# Patient Record
Sex: FEMALE | Race: WHITE | Employment: OTHER | URBAN - METROPOLITAN AREA
[De-identification: names, ages, dates, MRNs, and addresses within clinical notes are randomized per-mention and may not be internally consistent; named-entity substitution may affect disease eponyms.]

---

## 2017-04-12 ENCOUNTER — OFFICE VISIT (OUTPATIENT)
Dept: FAMILY MEDICINE CLINIC | Age: 67
End: 2017-04-12

## 2017-04-12 VITALS
BODY MASS INDEX: 36.82 KG/M2 | SYSTOLIC BLOOD PRESSURE: 124 MMHG | DIASTOLIC BLOOD PRESSURE: 82 MMHG | WEIGHT: 221 LBS | HEIGHT: 65 IN

## 2017-04-12 DIAGNOSIS — R07.81 RIB PAIN ON RIGHT SIDE: Primary | ICD-10-CM

## 2017-04-12 DIAGNOSIS — L03.113 CELLULITIS OF RIGHT UPPER EXTREMITY: ICD-10-CM

## 2017-04-12 DIAGNOSIS — R53.83 FATIGUE, UNSPECIFIED TYPE: ICD-10-CM

## 2017-04-12 DIAGNOSIS — Z11.59 NEED FOR HEPATITIS C SCREENING TEST: ICD-10-CM

## 2017-04-12 DIAGNOSIS — E78.2 MIXED HYPERLIPIDEMIA: ICD-10-CM

## 2017-04-12 LAB
A/G RATIO: 2 (ref 1.1–2.2)
ALBUMIN SERPL-MCNC: 4.5 G/DL (ref 3.4–5)
ALP BLD-CCNC: 79 U/L (ref 40–129)
ALT SERPL-CCNC: 27 U/L (ref 10–40)
ANION GAP SERPL CALCULATED.3IONS-SCNC: 16 MMOL/L (ref 3–16)
AST SERPL-CCNC: 18 U/L (ref 15–37)
BILIRUB SERPL-MCNC: 0.5 MG/DL (ref 0–1)
BUN BLDV-MCNC: 10 MG/DL (ref 7–20)
CALCIUM SERPL-MCNC: 9.2 MG/DL (ref 8.3–10.6)
CHLORIDE BLD-SCNC: 105 MMOL/L (ref 99–110)
CHOLESTEROL, TOTAL: 236 MG/DL (ref 0–199)
CO2: 26 MMOL/L (ref 21–32)
CREAT SERPL-MCNC: 0.8 MG/DL (ref 0.6–1.2)
GFR AFRICAN AMERICAN: >60
GFR NON-AFRICAN AMERICAN: >60
GLOBULIN: 2.2 G/DL
GLUCOSE BLD-MCNC: 98 MG/DL (ref 70–99)
HCT VFR BLD CALC: 47.4 % (ref 36–48)
HDLC SERPL-MCNC: 56 MG/DL (ref 40–60)
HEMOGLOBIN: 15.6 G/DL (ref 12–16)
HEPATITIS C ANTIBODY INTERPRETATION: NORMAL
LDL CHOLESTEROL CALCULATED: 142 MG/DL
MCH RBC QN AUTO: 30.7 PG (ref 26–34)
MCHC RBC AUTO-ENTMCNC: 32.8 G/DL (ref 31–36)
MCV RBC AUTO: 93.5 FL (ref 80–100)
PDW BLD-RTO: 12.9 % (ref 12.4–15.4)
PLATELET # BLD: 206 K/UL (ref 135–450)
PMV BLD AUTO: 10.8 FL (ref 5–10.5)
POTASSIUM SERPL-SCNC: 4.7 MMOL/L (ref 3.5–5.1)
RBC # BLD: 5.07 M/UL (ref 4–5.2)
SODIUM BLD-SCNC: 147 MMOL/L (ref 136–145)
TOTAL PROTEIN: 6.7 G/DL (ref 6.4–8.2)
TRIGL SERPL-MCNC: 190 MG/DL (ref 0–150)
TSH SERPL DL<=0.05 MIU/L-ACNC: 0.79 UIU/ML (ref 0.27–4.2)
VITAMIN D 25-HYDROXY: 28.2 NG/ML
VLDLC SERPL CALC-MCNC: 38 MG/DL
WBC # BLD: 6.1 K/UL (ref 4–11)

## 2017-04-12 PROCEDURE — 36415 COLL VENOUS BLD VENIPUNCTURE: CPT | Performed by: FAMILY MEDICINE

## 2017-04-12 PROCEDURE — 99214 OFFICE O/P EST MOD 30 MIN: CPT | Performed by: FAMILY MEDICINE

## 2017-04-12 RX ORDER — DOXYCYCLINE HYCLATE 100 MG
100 TABLET ORAL 2 TIMES DAILY
Qty: 14 TABLET | Refills: 0 | Status: SHIPPED | OUTPATIENT
Start: 2017-04-12 | End: 2017-04-22

## 2017-04-12 ASSESSMENT — ENCOUNTER SYMPTOMS
RESPIRATORY NEGATIVE: 1
GASTROINTESTINAL NEGATIVE: 1

## 2017-05-11 ENCOUNTER — TELEPHONE (OUTPATIENT)
Dept: FAMILY MEDICINE CLINIC | Age: 67
End: 2017-05-11

## 2018-03-09 ENCOUNTER — OFFICE VISIT (OUTPATIENT)
Dept: FAMILY MEDICINE CLINIC | Age: 68
End: 2018-03-09

## 2018-03-09 VITALS
WEIGHT: 223.4 LBS | SYSTOLIC BLOOD PRESSURE: 140 MMHG | BODY MASS INDEX: 37.22 KG/M2 | DIASTOLIC BLOOD PRESSURE: 80 MMHG | HEIGHT: 65 IN

## 2018-03-09 DIAGNOSIS — Z00.00 WELL ADULT EXAM: Primary | ICD-10-CM

## 2018-03-09 DIAGNOSIS — R20.0 NUMBNESS OF TOES: ICD-10-CM

## 2018-03-09 LAB
A/G RATIO: 1.9 (ref 1.1–2.2)
ALBUMIN SERPL-MCNC: 4.3 G/DL (ref 3.4–5)
ALP BLD-CCNC: 80 U/L (ref 40–129)
ALT SERPL-CCNC: 19 U/L (ref 10–40)
ANION GAP SERPL CALCULATED.3IONS-SCNC: 14 MMOL/L (ref 3–16)
AST SERPL-CCNC: 20 U/L (ref 15–37)
BILIRUB SERPL-MCNC: 0.4 MG/DL (ref 0–1)
BUN BLDV-MCNC: 11 MG/DL (ref 7–20)
CALCIUM SERPL-MCNC: 8.7 MG/DL (ref 8.3–10.6)
CHLORIDE BLD-SCNC: 107 MMOL/L (ref 99–110)
CHOLESTEROL, TOTAL: 219 MG/DL (ref 0–199)
CO2: 23 MMOL/L (ref 21–32)
CREAT SERPL-MCNC: 0.7 MG/DL (ref 0.6–1.2)
GFR AFRICAN AMERICAN: >60
GFR NON-AFRICAN AMERICAN: >60
GLOBULIN: 2.3 G/DL
GLUCOSE BLD-MCNC: 104 MG/DL (ref 70–99)
HCT VFR BLD CALC: 45 % (ref 36–48)
HDLC SERPL-MCNC: 53 MG/DL (ref 40–60)
HEMOGLOBIN: 15.4 G/DL (ref 12–16)
IRON: 101 UG/DL (ref 37–145)
LDL CHOLESTEROL CALCULATED: 143 MG/DL
MCH RBC QN AUTO: 31.2 PG (ref 26–34)
MCHC RBC AUTO-ENTMCNC: 34.3 G/DL (ref 31–36)
MCV RBC AUTO: 91 FL (ref 80–100)
PDW BLD-RTO: 13.1 % (ref 12.4–15.4)
PLATELET # BLD: 212 K/UL (ref 135–450)
PMV BLD AUTO: 10.4 FL (ref 5–10.5)
POTASSIUM SERPL-SCNC: 4.5 MMOL/L (ref 3.5–5.1)
RBC # BLD: 4.94 M/UL (ref 4–5.2)
SODIUM BLD-SCNC: 144 MMOL/L (ref 136–145)
TOTAL PROTEIN: 6.6 G/DL (ref 6.4–8.2)
TRIGL SERPL-MCNC: 117 MG/DL (ref 0–150)
TSH SERPL DL<=0.05 MIU/L-ACNC: 1.26 UIU/ML (ref 0.27–4.2)
VITAMIN B-12: 398 PG/ML (ref 211–911)
VITAMIN D 25-HYDROXY: 29.6 NG/ML
VLDLC SERPL CALC-MCNC: 23 MG/DL
WBC # BLD: 4.6 K/UL (ref 4–11)

## 2018-03-09 PROCEDURE — 36415 COLL VENOUS BLD VENIPUNCTURE: CPT | Performed by: FAMILY MEDICINE

## 2018-03-09 PROCEDURE — 99397 PER PM REEVAL EST PAT 65+ YR: CPT | Performed by: FAMILY MEDICINE

## 2018-03-09 ASSESSMENT — PATIENT HEALTH QUESTIONNAIRE - PHQ9
2. FEELING DOWN, DEPRESSED OR HOPELESS: 0
SUM OF ALL RESPONSES TO PHQ QUESTIONS 1-9: 0
1. LITTLE INTEREST OR PLEASURE IN DOING THINGS: 0
SUM OF ALL RESPONSES TO PHQ9 QUESTIONS 1 & 2: 0

## 2018-03-09 ASSESSMENT — ENCOUNTER SYMPTOMS
GASTROINTESTINAL NEGATIVE: 1
RESPIRATORY NEGATIVE: 1

## 2018-03-09 NOTE — PROGRESS NOTES
Subjective:      Patient ID: Lorelei Butts is a 75 y.o. female. HPI   Well exam  Feels good   Noticed some numbness in her toes  No other complaints  Feels good   Stem cell injection left knee   Review of Systems   Constitutional: Negative. Respiratory: Negative. Cardiovascular: Negative. Gastrointestinal: Negative. Skin: Negative. Neurological: Negative. YOB: 1950    Date of Visit:  3/9/2018    Allergies   Allergen Reactions    Morphine Nausea And Vomiting    Penicillins Rash       No outpatient prescriptions have been marked as taking for the 3/9/18 encounter (Office Visit) with Fransisca Orosco DO. Vitals:    03/09/18 0917   BP: (!) 140/80   Cuff Size: Large Adult   Weight: 223 lb 6.4 oz (101.3 kg)   Height: 5' 5\" (1.651 m)     Body mass index is 37.18 kg/m². Wt Readings from Last 3 Encounters:   03/09/18 223 lb 6.4 oz (101.3 kg)   04/12/17 221 lb (100.2 kg)   09/20/16 226 lb (102.5 kg)     BP Readings from Last 3 Encounters:   03/09/18 (!) 140/80   04/12/17 124/82   09/20/16 136/82       Objective:   Physical Exam   Constitutional: She is oriented to person, place, and time. She appears well-developed and well-nourished. No distress. HENT:   Head: Normocephalic. Right Ear: External ear normal.   Left Ear: External ear normal.   Nose: Nose normal.   Mouth/Throat: Oropharynx is clear and moist.   Eyes: Conjunctivae and EOM are normal. Pupils are equal, round, and reactive to light. Left eye exhibits no discharge. Neck: Normal range of motion. No thyromegaly present. Cardiovascular: Normal rate, regular rhythm, normal heart sounds and intact distal pulses. No murmur heard. Pulmonary/Chest: Effort normal. No respiratory distress. She has no wheezes. She has no rales. Abdominal: Soft. She exhibits no distension and no mass. There is no guarding. Lymphadenopathy:     She has no cervical adenopathy.    Neurological: She is alert and oriented to person, place, and time. She has normal reflexes. Skin: Skin is warm and dry. No rash noted. No erythema. Psychiatric: She has a normal mood and affect. Her behavior is normal. Judgment and thought content normal.   Nursing note and vitals reviewed. Assessment:      Assessment/plan;  Callie Jeffy was seen today for check-up and blood work. Diagnoses and all orders for this visit:    Well adult exam  -     CBC  -     Comprehensive Metabolic Panel  -     Lipid Panel  -     Iron  -     TSH without Reflex  -     Vitamin B12  -     Vitamin D 25 Hydroxy    Numbness of toes      Return in about 1 year (around 3/9/2019).   Refuses vaccinations   Will do fit card

## 2018-07-06 ENCOUNTER — PATIENT MESSAGE (OUTPATIENT)
Dept: FAMILY MEDICINE CLINIC | Age: 68
End: 2018-07-06

## 2018-07-06 NOTE — TELEPHONE ENCOUNTER
From: Gris Cabrera May  To: Nora Jiang  Sent: 7/5/2018 1:09 PM EDT  Subject: Mammogram    Our records show that you are in need of a screening mammogram. The American Cancer Society's guidelines state that early detection of breast cancer improves the chances that breast cancer can be diagnosed at an early stage and treated successfully. Women age 36 and older should have a mammogram every year and should continue to do so for as long as they are in good health. To schedule a screening mammogram, you do not need an order from your doctor. You can call [facility] ([phone]) to schedule a mammogram at your earliest convenience. You can also call the office at [phone] to schedule your mammogram with the mobile unit that will be at our office on July 11th! If you already had a mammogram in the last 12 months, congratulations for taking this step toward good health!  Please call our office to inform us of the date and location of your (most recent) mammogram. We also welcome you to reach out to us online using

## 2018-10-16 DIAGNOSIS — Z12.12 SCREENING FOR COLORECTAL CANCER: Primary | ICD-10-CM

## 2018-10-16 DIAGNOSIS — Z12.11 SCREENING FOR COLORECTAL CANCER: Primary | ICD-10-CM

## 2018-10-16 LAB
CONTROL: NORMAL
HEMOCCULT STL QL: NEGATIVE

## 2018-10-16 PROCEDURE — 82274 ASSAY TEST FOR BLOOD FECAL: CPT | Performed by: FAMILY MEDICINE

## 2018-11-29 ENCOUNTER — OFFICE VISIT (OUTPATIENT)
Dept: FAMILY MEDICINE CLINIC | Age: 68
End: 2018-11-29
Payer: COMMERCIAL

## 2018-11-29 VITALS — DIASTOLIC BLOOD PRESSURE: 86 MMHG | BODY MASS INDEX: 37.18 KG/M2 | HEIGHT: 65 IN | SYSTOLIC BLOOD PRESSURE: 138 MMHG

## 2018-11-29 DIAGNOSIS — M75.81 ROTATOR CUFF TENDONITIS, RIGHT: ICD-10-CM

## 2018-11-29 DIAGNOSIS — M25.511 RIGHT SHOULDER PAIN, UNSPECIFIED CHRONICITY: Primary | ICD-10-CM

## 2018-11-29 PROCEDURE — 99213 OFFICE O/P EST LOW 20 MIN: CPT | Performed by: FAMILY MEDICINE

## 2018-11-29 PROCEDURE — 20610 DRAIN/INJ JOINT/BURSA W/O US: CPT | Performed by: FAMILY MEDICINE

## 2018-11-29 RX ORDER — METHYLPREDNISOLONE ACETATE 80 MG/ML
80 INJECTION, SUSPENSION INTRA-ARTICULAR; INTRALESIONAL; INTRAMUSCULAR; SOFT TISSUE ONCE
Status: COMPLETED | OUTPATIENT
Start: 2018-11-29 | End: 2018-11-29

## 2018-11-29 RX ORDER — TIZANIDINE 4 MG/1
4 TABLET ORAL 3 TIMES DAILY
Qty: 30 TABLET | Refills: 0 | Status: SHIPPED | OUTPATIENT
Start: 2018-11-29 | End: 2018-12-09

## 2018-11-29 RX ADMIN — METHYLPREDNISOLONE ACETATE 80 MG: 80 INJECTION, SUSPENSION INTRA-ARTICULAR; INTRALESIONAL; INTRAMUSCULAR; SOFT TISSUE at 11:59

## 2018-11-29 ASSESSMENT — PATIENT HEALTH QUESTIONNAIRE - PHQ9
SUM OF ALL RESPONSES TO PHQ9 QUESTIONS 1 & 2: 0
2. FEELING DOWN, DEPRESSED OR HOPELESS: 0
SUM OF ALL RESPONSES TO PHQ QUESTIONS 1-9: 0
SUM OF ALL RESPONSES TO PHQ QUESTIONS 1-9: 0
1. LITTLE INTEREST OR PLEASURE IN DOING THINGS: 0

## 2018-11-29 ASSESSMENT — ENCOUNTER SYMPTOMS: BACK PAIN: 1

## 2018-11-29 NOTE — PROGRESS NOTES
kg)     BP Readings from Last 3 Encounters:   11/29/18 (!) 144/94   03/09/18 (!) 140/80   04/12/17 124/82       Objective:   Physical Exam   Constitutional: She appears well-developed. She appears distressed. HENT:   Head: Normocephalic. Abdominal: There is no tenderness. Musculoskeletal: She exhibits tenderness. Right shoulder  Decreased rom in all fields  Point tenderness ant shoulder   Under sterile conditions injected 1 cc depo medrol and 1/4 cc marcaine   Tolerated well       Assessment:     Assessment/plan;  [facility] was seen today for shoulder pain. Diagnoses and all orders for this visit:    Right shoulder pain, unspecified chronicity    Rotator cuff tendonitis, right    Other orders  -     tiZANidine (ZANAFLEX) 4 MG tablet; Take 1 tablet by mouth 3 times daily for 10 days  -     methylPREDNISolone acetate (DEPO-MEDROL) injection 80 mg; Inject 1 mL into the articular space once      Return if symptoms worsen or fail to improve.     Isaac Lepe, DO

## 2018-11-30 ENCOUNTER — TELEPHONE (OUTPATIENT)
Dept: FAMILY MEDICINE CLINIC | Age: 68
End: 2018-11-30

## 2018-11-30 DIAGNOSIS — M75.81 ROTATOR CUFF TENDINITIS, RIGHT: Primary | ICD-10-CM

## 2018-11-30 RX ORDER — TRAMADOL HYDROCHLORIDE 50 MG/1
50 TABLET ORAL EVERY 6 HOURS PRN
Qty: 20 TABLET | Refills: 0 | Status: SHIPPED | OUTPATIENT
Start: 2018-11-30 | End: 2018-12-05

## 2018-12-03 ENCOUNTER — HOSPITAL ENCOUNTER (OUTPATIENT)
Age: 68
Discharge: HOME | End: 2018-12-03
Payer: MEDICARE

## 2018-12-03 ENCOUNTER — HOSPITAL ENCOUNTER (OUTPATIENT)
Dept: GENERAL RADIOLOGY | Age: 68
Discharge: HOME | End: 2018-12-03
Payer: MEDICARE

## 2018-12-03 ENCOUNTER — TELEPHONE (OUTPATIENT)
Dept: FAMILY MEDICINE CLINIC | Age: 68
End: 2018-12-03

## 2018-12-03 DIAGNOSIS — G89.29 CHRONIC RIGHT SHOULDER PAIN: ICD-10-CM

## 2018-12-03 DIAGNOSIS — M25.511 CHRONIC RIGHT SHOULDER PAIN: ICD-10-CM

## 2018-12-03 PROCEDURE — 73030 X-RAY EXAM OF SHOULDER: CPT

## 2018-12-03 PROCEDURE — 72040 X-RAY EXAM NECK SPINE 2-3 VW: CPT

## 2018-12-03 RX ORDER — METHYLPREDNISOLONE 4 MG/1
TABLET ORAL
Qty: 1 KIT | Refills: 0 | Status: SHIPPED | OUTPATIENT
Start: 2018-12-03 | End: 2021-07-20 | Stop reason: SDUPTHER

## 2018-12-03 NOTE — TELEPHONE ENCOUNTER
Next step would be mri  I can call in a steroid pack for her to try to see if this helps in the meantime

## 2018-12-03 NOTE — TELEPHONE ENCOUNTER
Patient is in a lot of pain and she had xrays today. She would like to know what the next step is Please call back.

## 2019-11-19 ENCOUNTER — OFFICE VISIT (OUTPATIENT)
Dept: FAMILY MEDICINE CLINIC | Age: 69
End: 2019-11-19
Payer: COMMERCIAL

## 2019-11-19 VITALS
SYSTOLIC BLOOD PRESSURE: 134 MMHG | HEIGHT: 65 IN | DIASTOLIC BLOOD PRESSURE: 74 MMHG | WEIGHT: 208 LBS | BODY MASS INDEX: 34.66 KG/M2

## 2019-11-19 DIAGNOSIS — Z12.11 COLON CANCER SCREENING: Primary | ICD-10-CM

## 2019-11-19 PROCEDURE — 99213 OFFICE O/P EST LOW 20 MIN: CPT | Performed by: FAMILY MEDICINE

## 2019-11-19 ASSESSMENT — PATIENT HEALTH QUESTIONNAIRE - PHQ9
1. LITTLE INTEREST OR PLEASURE IN DOING THINGS: 0
SUM OF ALL RESPONSES TO PHQ QUESTIONS 1-9: 0
2. FEELING DOWN, DEPRESSED OR HOPELESS: 0
SUM OF ALL RESPONSES TO PHQ QUESTIONS 1-9: 0
SUM OF ALL RESPONSES TO PHQ9 QUESTIONS 1 & 2: 0

## 2019-11-19 ASSESSMENT — ENCOUNTER SYMPTOMS
GASTROINTESTINAL NEGATIVE: 1
RESPIRATORY NEGATIVE: 1

## 2019-12-30 ENCOUNTER — TELEPHONE (OUTPATIENT)
Dept: FAMILY MEDICINE CLINIC | Age: 69
End: 2019-12-30

## 2020-05-05 PROBLEM — Z90.13 H/O BILATERAL MASTECTOMY: Status: ACTIVE | Noted: 2020-05-05

## 2020-07-30 ENCOUNTER — OFFICE VISIT (OUTPATIENT)
Dept: PRIMARY CARE CLINIC | Age: 70
End: 2020-07-30
Payer: MEDICARE

## 2020-07-30 PROCEDURE — 99211 OFF/OP EST MAY X REQ PHY/QHP: CPT | Performed by: NURSE PRACTITIONER

## 2020-07-30 NOTE — PROGRESS NOTES
Nikki Tiffanie received a viral test for COVID-19. They were educated on isolation and quarantine as appropriate. For any symptoms, they were directed to seek care from their PCP, given contact information to establish with a doctor, directed to an urgent care or the emergency room.

## 2020-08-01 LAB — SARS-COV-2, NAA: NOT DETECTED

## 2020-08-13 ENCOUNTER — TELEPHONE (OUTPATIENT)
Dept: FAMILY MEDICINE CLINIC | Age: 70
End: 2020-08-13

## 2020-08-13 NOTE — TELEPHONE ENCOUNTER
----- Message from Venkatesh Mcnair sent at 8/13/2020  1:40 PM EDT -----  Subject: Referral Request    QUESTIONS   Reason for referral request? Patient would like a [facility] referral to   an [address] [place] [address] provider   Has the physician seen you for this condition before? No   Preferred Specialist (if applicable)? Do you already have an appointment scheduled? No  Additional Information for Provider?   ---------------------------------------------------------------------------  --------------  CALL BACK INFO  What is the best way for the office to contact you? OK to leave message on   voicemail  Preferred Call Back Phone Number?  [phone]

## 2020-11-11 ENCOUNTER — OFFICE VISIT (OUTPATIENT)
Dept: PRIMARY CARE CLINIC | Age: 70
End: 2020-11-11
Payer: MEDICARE

## 2020-11-11 PROCEDURE — 99211 OFF/OP EST MAY X REQ PHY/QHP: CPT | Performed by: NURSE PRACTITIONER

## 2020-11-11 NOTE — PROGRESS NOTES
Mukund Morfin received a viral test for COVID-19. They were educated on isolation and quarantine as appropriate. For any symptoms, they were directed to seek care from their PCP, given contact information to establish with a doctor, directed to an urgent care or the emergency room.

## 2020-11-13 LAB — SARS-COV-2, NAA: NOT DETECTED

## 2020-11-16 ENCOUNTER — TELEPHONE (OUTPATIENT)
Dept: FAMILY MEDICINE CLINIC | Age: 70
End: 2020-11-16

## 2020-11-16 NOTE — TELEPHONE ENCOUNTER
Patient is calling requesting a referral to see a podiatrist she states she is having pain in her left foot she didn't injure her foot she is just having pain and she would like to know who  would recommend please advise

## 2020-12-18 ENCOUNTER — OFFICE VISIT (OUTPATIENT)
Dept: PRIMARY CARE CLINIC | Age: 70
End: 2020-12-18
Payer: MEDICARE

## 2020-12-18 PROCEDURE — 99211 OFF/OP EST MAY X REQ PHY/QHP: CPT | Performed by: NURSE PRACTITIONER

## 2020-12-18 NOTE — PROGRESS NOTES
Paul Purchase received a viral test for COVID-19. They were educated on isolation and quarantine as appropriate. For any symptoms, they were directed to seek care from their PCP, given contact information to establish with a doctor, directed to an urgent care or the emergency room.

## 2020-12-19 LAB — SARS-COV-2, NAA: NOT DETECTED

## 2021-06-18 ENCOUNTER — TELEPHONE (OUTPATIENT)
Dept: FAMILY MEDICINE CLINIC | Age: 71
End: 2021-06-18

## 2021-06-18 DIAGNOSIS — Z00.00 WELL ADULT EXAM: Primary | ICD-10-CM

## 2021-06-18 NOTE — TELEPHONE ENCOUNTER
----- Message from Josh Rashel sent at 6/18/2021  3:28 PM EDT -----  Subject: Referral Request    QUESTIONS   Reason for referral request? order blood work need for upcoming appt   Has the physician seen you for this condition before? No   Preferred Specialist (if applicable)? Do you already have an appointment scheduled? No  Additional Information for Provider? patient request order blood work want   to blood work before her appt that she has schedule 7/2/21 please call   patient when blood work order is ready   ---------------------------------------------------------------------------  --------------  [address]  What is the best way for the office to contact you? OK to leave message on   voicemail  Preferred Call Back Phone Number?  [phone]

## 2021-06-28 ENCOUNTER — NURSE ONLY (OUTPATIENT)
Dept: FAMILY MEDICINE CLINIC | Age: 71
End: 2021-06-28
Payer: MEDICARE

## 2021-06-28 ENCOUNTER — TELEPHONE (OUTPATIENT)
Dept: FAMILY MEDICINE CLINIC | Age: 71
End: 2021-06-28

## 2021-06-28 DIAGNOSIS — Z00.00 WELL ADULT EXAM: ICD-10-CM

## 2021-06-28 LAB
A/G RATIO: 2.1 (ref 1.1–2.2)
ALBUMIN SERPL-MCNC: 4.4 G/DL (ref 3.4–5)
ALP BLD-CCNC: 80 U/L (ref 40–129)
ALT SERPL-CCNC: 26 U/L (ref 10–40)
ANION GAP SERPL CALCULATED.3IONS-SCNC: 14 MMOL/L (ref 3–16)
AST SERPL-CCNC: 22 U/L (ref 15–37)
BILIRUB SERPL-MCNC: 0.5 MG/DL (ref 0–1)
BUN BLDV-MCNC: 12 MG/DL (ref 7–20)
CALCIUM SERPL-MCNC: 9.2 MG/DL (ref 8.3–10.6)
CHLORIDE BLD-SCNC: 102 MMOL/L (ref 99–110)
CHOLESTEROL, TOTAL: 234 MG/DL (ref 0–199)
CO2: 26 MMOL/L (ref 21–32)
CREAT SERPL-MCNC: 0.8 MG/DL (ref 0.6–1.2)
GFR AFRICAN AMERICAN: >60
GFR NON-AFRICAN AMERICAN: >60
GLOBULIN: 2.1 G/DL
GLUCOSE BLD-MCNC: 108 MG/DL (ref 70–99)
HCT VFR BLD CALC: 44.7 % (ref 36–48)
HDLC SERPL-MCNC: 58 MG/DL (ref 40–60)
HEMOGLOBIN: 15.3 G/DL (ref 12–16)
LDL CHOLESTEROL CALCULATED: 145 MG/DL
MCH RBC QN AUTO: 31.1 PG (ref 26–34)
MCHC RBC AUTO-ENTMCNC: 34.2 G/DL (ref 31–36)
MCV RBC AUTO: 91.1 FL (ref 80–100)
PDW BLD-RTO: 13.1 % (ref 12.4–15.4)
PLATELET # BLD: 205 K/UL (ref 135–450)
PMV BLD AUTO: 10.3 FL (ref 5–10.5)
POTASSIUM SERPL-SCNC: 4.7 MMOL/L (ref 3.5–5.1)
RBC # BLD: 4.91 M/UL (ref 4–5.2)
SODIUM BLD-SCNC: 142 MMOL/L (ref 136–145)
TOTAL PROTEIN: 6.5 G/DL (ref 6.4–8.2)
TRIGL SERPL-MCNC: 157 MG/DL (ref 0–150)
TSH REFLEX: 1.02 UIU/ML (ref 0.27–4.2)
VLDLC SERPL CALC-MCNC: 31 MG/DL
WBC # BLD: 5.9 K/UL (ref 4–11)

## 2021-06-28 PROCEDURE — 36415 COLL VENOUS BLD VENIPUNCTURE: CPT | Performed by: FAMILY MEDICINE

## 2021-07-02 ENCOUNTER — OFFICE VISIT (OUTPATIENT)
Dept: FAMILY MEDICINE CLINIC | Age: 71
End: 2021-07-02
Payer: MEDICARE

## 2021-07-02 VITALS
WEIGHT: 222.8 LBS | BODY MASS INDEX: 37.12 KG/M2 | SYSTOLIC BLOOD PRESSURE: 148 MMHG | HEIGHT: 65 IN | DIASTOLIC BLOOD PRESSURE: 80 MMHG

## 2021-07-02 DIAGNOSIS — R10.31 RIGHT LOWER QUADRANT PAIN: ICD-10-CM

## 2021-07-02 DIAGNOSIS — Z00.00 WELL ADULT EXAM: Primary | ICD-10-CM

## 2021-07-02 DIAGNOSIS — C50.919 MALIGNANT NEOPLASM OF BREAST, STAGE 1, UNSPECIFIED ESTROGEN RECEPTOR STATUS, UNSPECIFIED LATERALITY (HCC): ICD-10-CM

## 2021-07-02 PROCEDURE — 99397 PER PM REEVAL EST PAT 65+ YR: CPT | Performed by: FAMILY MEDICINE

## 2021-07-02 PROCEDURE — 3288F FALL RISK ASSESSMENT DOCD: CPT | Performed by: FAMILY MEDICINE

## 2021-07-02 ASSESSMENT — PATIENT HEALTH QUESTIONNAIRE - PHQ9
SUM OF ALL RESPONSES TO PHQ QUESTIONS 1-9: 0
1. LITTLE INTEREST OR PLEASURE IN DOING THINGS: 0
SUM OF ALL RESPONSES TO PHQ9 QUESTIONS 1 & 2: 0
SUM OF ALL RESPONSES TO PHQ QUESTIONS 1-9: 0
2. FEELING DOWN, DEPRESSED OR HOPELESS: 0
SUM OF ALL RESPONSES TO PHQ QUESTIONS 1-9: 0

## 2021-07-02 ASSESSMENT — ENCOUNTER SYMPTOMS
ABDOMINAL PAIN: 1
RESPIRATORY NEGATIVE: 1

## 2021-07-02 NOTE — PROGRESS NOTES
c       OUTPATIENT PROGRESS NOTE  Date of Service:  7/2/2021  Address: [facility]  [facility]  [address] [address]  Dept: [phone]  Loc: [phone]    Subjective:      Patient ID:  [id number]  Tyler Koch is a 66 y.o. female     HPI  Well exam  Job is very busy in real estate  No new concerns except having pain in right flank and the pain comes around to the front and into lower abd  No urinary symptoms   No change in bowels   Going on several weeks but getting worse      Review of Systems   Constitutional: Negative. HENT: Negative. Respiratory: Negative. Cardiovascular: Negative. Gastrointestinal: Positive for abdominal pain. Genitourinary: Positive for flank pain. Neurological: Negative. Psychiatric/Behavioral: Positive for decreased concentration. Objective:   YOB: 1950    Date of Visit:  7/2/2021       Allergies   Allergen Reactions    Morphine Nausea And Vomiting    Penicillins Rash       Outpatient Medications Marked as Taking for the 7/2/21 encounter (Office Visit) with Serene Liner, DO   Medication Sig Dispense Refill    UBIQUINOL PO Take by mouth         Vitals:    07/02/21 0851   BP: (!) 148/80   Site: Right Upper Arm   Position: Sitting   Cuff Size: Medium Adult   Weight: 222 lb 12.8 oz (101.1 kg)   Height: 5' 5\" (1.651 m)     Body mass index is 37.08 kg/m². Wt Readings from Last 3 Encounters:   07/02/21 222 lb 12.8 oz (101.1 kg)   11/19/19 208 lb (94.3 kg)   03/09/18 223 lb 6.4 oz (101.3 kg)     BP Readings from Last 3 Encounters:   07/02/21 (!) 148/80   11/19/19 134/74   11/29/18 138/86       Physical Exam  Vitals and nursing note reviewed. Constitutional:       Appearance: She is well-developed. HENT:      Head: Normocephalic. Neck:      Thyroid: No thyromegaly. Cardiovascular:      Rate and Rhythm: Normal rate and regular rhythm.       Heart sounds: Normal heart sounds. Pulmonary:      Effort: Pulmonary effort is normal.      Breath sounds: Normal breath sounds. Abdominal:      Tenderness: There is abdominal tenderness. Comments: Right flank and right upper and lower quad pain to palp   No masses  No rebound or guarding   Musculoskeletal:         General: Tenderness present. Lymphadenopathy:      Cervical: No cervical adenopathy. Neurological:      Mental Status: She is alert and oriented to person, place, and time. Psychiatric:         Behavior: Behavior normal.         Thought Content: Thought content normal.         Judgment: Judgment normal.            Assessment/Plan       Assessment/plan;  Martha Shanks was seen today for annual exam, pain, other and other.     Diagnoses and all orders for this visit:    Well adult exam    Right lower quadrant pain  -     CT ABDOMEN PELVIS WO CONTRAST Additional Contrast? None; Future    Malignant neoplasm of breast, stage 1, unspecified estrogen receptor status, unspecified laterality ([facility] [address]      Reviewed lab with patient   Discussed low fat diet   Await ct results   Padmini Orozco,

## 2021-07-08 ENCOUNTER — HOSPITAL ENCOUNTER (OUTPATIENT)
Dept: CT IMAGING | Age: 71
Discharge: HOME | End: 2021-07-08
Payer: MEDICARE

## 2021-07-08 DIAGNOSIS — R10.31 RIGHT LOWER QUADRANT PAIN: ICD-10-CM

## 2021-07-08 PROCEDURE — 74176 CT ABD & PELVIS W/O CONTRAST: CPT

## 2021-07-09 ENCOUNTER — TELEPHONE (OUTPATIENT)
Dept: FAMILY MEDICINE CLINIC | Age: 71
End: 2021-07-09

## 2021-07-09 NOTE — TELEPHONE ENCOUNTER
No urinary or changes in bowels. It is the same starts at rib and goes to groin. On right side. Last night finally broke down and took some Tylenol some relief but not a lot. Still feels crappy--does not feel good overall. No fever no h/a no cough.

## 2021-07-09 NOTE — TELEPHONE ENCOUNTER
Patient requesting a returned call today. States since the CT was normal she needs to know what her next step is for the pain she is having.

## 2021-07-20 ENCOUNTER — OFFICE VISIT (OUTPATIENT)
Dept: FAMILY MEDICINE CLINIC | Age: 71
End: 2021-07-20
Payer: MEDICARE

## 2021-07-20 VITALS
DIASTOLIC BLOOD PRESSURE: 62 MMHG | SYSTOLIC BLOOD PRESSURE: 150 MMHG | BODY MASS INDEX: 36.99 KG/M2 | HEIGHT: 65 IN | WEIGHT: 222 LBS

## 2021-07-20 DIAGNOSIS — R10.9 RIGHT FLANK PAIN: Primary | ICD-10-CM

## 2021-07-20 PROCEDURE — 99213 OFFICE O/P EST LOW 20 MIN: CPT | Performed by: FAMILY MEDICINE

## 2021-07-20 RX ORDER — METHYLPREDNISOLONE 4 MG/1
TABLET ORAL
Qty: 1 KIT | Refills: 0 | Status: SHIPPED | OUTPATIENT
Start: 2021-07-20 | End: 2021-07-20

## 2021-07-20 RX ORDER — METHYLPREDNISOLONE 4 MG/1
TABLET ORAL
Qty: 1 KIT | Refills: 0 | Status: SHIPPED | OUTPATIENT
Start: 2021-07-20 | End: 2021-07-26

## 2021-07-20 ASSESSMENT — ENCOUNTER SYMPTOMS
BOWEL INCONTINENCE: 0
ABDOMINAL PAIN: 1

## 2021-07-20 NOTE — PROGRESS NOTES
OUTPATIENT PROGRESS NOTE  Date of Service:  7/20/2021  Address:  [address] [facility] [address] [facility],First [address] [address]  Dept: [phone]  Loc: [phone]    Subjective:      Patient ID:  [id number]  Edelmira Small is a 66 y.o. female     Flank Pain  This is a recurrent problem. The current episode started more than 1 month ago. The problem occurs constantly. The problem has been waxing and waning since onset. The pain is present in the thoracic spine. The quality of the pain is described as aching, burning, shooting and stabbing. Radiates to: radiates from right flank into right lower abd  The pain is at a severity of 6/10. The pain is moderate. The symptoms are aggravated by bending, position, standing and twisting. Associated symptoms include abdominal pain and pelvic pain. Pertinent negatives include no bladder incontinence, bowel incontinence, chest pain, dysuria, fever, headaches, leg pain, paresis, perianal numbness, tingling or weight loss. Review of Systems   Constitutional: Negative for fever and weight loss. Cardiovascular: Negative for chest pain. Gastrointestinal: Positive for abdominal pain. Negative for bowel incontinence. Genitourinary: Positive for flank pain and pelvic pain. Negative for bladder incontinence and dysuria. Neurological: Negative for tingling and headaches. Objective:   YOB: 1950    Date of Visit:  7/20/2021       Allergies   Allergen Reactions    Morphine Nausea And Vomiting    Penicillins Rash       Outpatient Medications Marked as Taking for the 7/20/21 encounter (Office Visit) with Geetha Baker, DO   Medication Sig Dispense Refill    methylPREDNISolone (MEDROL DOSEPACK) 4 MG tablet Take by mouth. 1 kit 0    methylPREDNISolone (MEDROL DOSEPACK) 4 MG tablet Take by mouth.  1 kit 0       Vitals:    07/20/21 0850   BP: (!) 150/62   Weight: 222 lb (100.7 kg)   Height: 5' 5\" (1.651 m)     Body mass index is 36.94 kg/m². Wt Readings from Last 3 Encounters:   07/20/21 222 lb (100.7 kg)   07/02/21 222 lb 12.8 oz (101.1 kg)   11/19/19 208 lb (94.3 kg)     BP Readings from Last 3 Encounters:   07/20/21 (!) 150/62   07/02/21 (!) 148/80   11/19/19 134/74       Physical Exam  Constitutional:       General: She is not in acute distress. Appearance: She is well-developed. HENT:      Head: Normocephalic. Abdominal:      Tenderness: There is abdominal tenderness. There is right CVA tenderness. Neurological:      Mental Status: She is alert and oriented to person, place, and time. Psychiatric:         Behavior: Behavior normal.         Thought Content: Thought content normal.         Judgment: Judgment normal.            Assessment/Plan       Assessment/plan;  Fran Duenas was seen today for flank pain. Diagnoses and all orders for this visit:    Right flank pain  -     methylPREDNISolone (MEDROL DOSEPACK) 4 MG tablet; Take by mouth. Other orders  -     Discontinue: methylPREDNISolone (MEDROL DOSEPACK) 4 MG tablet; Take by mouth. Next step mri if pain continues    No follow-ups on file.                     Shraddha Imus, DO

## 2021-08-10 ENCOUNTER — OFFICE VISIT (OUTPATIENT)
Dept: FAMILY MEDICINE CLINIC | Age: 71
End: 2021-08-10
Payer: MEDICARE

## 2021-08-10 VITALS
HEART RATE: 98 BPM | OXYGEN SATURATION: 98 % | SYSTOLIC BLOOD PRESSURE: 110 MMHG | BODY MASS INDEX: 36.99 KG/M2 | DIASTOLIC BLOOD PRESSURE: 74 MMHG | HEIGHT: 65 IN | WEIGHT: 222 LBS

## 2021-08-10 DIAGNOSIS — R10.9 RIGHT FLANK PAIN: Primary | ICD-10-CM

## 2021-08-10 PROCEDURE — 99213 OFFICE O/P EST LOW 20 MIN: CPT | Performed by: FAMILY MEDICINE

## 2021-08-10 SDOH — ECONOMIC STABILITY: FOOD INSECURITY: WITHIN THE PAST 12 MONTHS, THE FOOD YOU BOUGHT JUST DIDN'T LAST AND YOU DIDN'T HAVE MONEY TO GET MORE.: NEVER TRUE

## 2021-08-10 SDOH — ECONOMIC STABILITY: FOOD INSECURITY: WITHIN THE PAST 12 MONTHS, YOU WORRIED THAT YOUR FOOD WOULD RUN OUT BEFORE YOU GOT MONEY TO BUY MORE.: NEVER TRUE

## 2021-08-10 ASSESSMENT — ENCOUNTER SYMPTOMS: ABDOMINAL PAIN: 1

## 2021-08-10 ASSESSMENT — SOCIAL DETERMINANTS OF HEALTH (SDOH): HOW HARD IS IT FOR YOU TO PAY FOR THE VERY BASICS LIKE FOOD, HOUSING, MEDICAL CARE, AND HEATING?: NOT HARD AT ALL

## 2021-08-11 NOTE — PROGRESS NOTES
OUTPATIENT PROGRESS NOTE  Date of Service:  8/10/2021  Address: [facility] [address] [facility],[address] [address]  Dept: [phone]  Loc: [phone]    Subjective:      Patient ID:  [id number]  Tiki Park is a 66 y.o. female     HPI   Right flank pain  Still with pain   Medrol helped slightly  Still pain with movements   Starts right under scapula and goes down in to front of her abd       Review of Systems   Constitutional: Positive for activity change. Gastrointestinal: Positive for abdominal pain. Genitourinary: Positive for flank pain. Musculoskeletal: Positive for myalgias. Objective:   YOB: 1950    Date of Visit:  8/10/2021       Allergies   Allergen Reactions    Morphine Nausea And Vomiting    Penicillins Rash       No outpatient medications have been marked as taking for the 8/10/21 encounter (Office Visit) with Beatrice Frausto DO. Vitals:    08/10/21 1300   BP: 110/74   Site: Right Lower Arm   Position: Sitting   Cuff Size: Large Adult   Pulse: 98   SpO2: 98%   Weight: 222 lb (100.7 kg)   Height: 5' 5\" (1.651 m)     Body mass index is 36.94 kg/m². Wt Readings from Last 3 Encounters:   08/10/21 222 lb (100.7 kg)   07/20/21 222 lb (100.7 kg)   07/02/21 222 lb 12.8 oz (101.1 kg)     BP Readings from Last 3 Encounters:   08/10/21 110/74   07/20/21 (!) 150/62   07/02/21 (!) 148/80       Physical Exam  Constitutional:       General: She is not in acute distress. Appearance: She is well-developed. HENT:      Head: Normocephalic. Musculoskeletal:         General: Tenderness (right flank muscle spasm) present. Neurological:      Mental Status: She is alert and oriented to person, place, and time. Psychiatric:         Behavior: Behavior normal.         Thought Content:  Thought content normal.         Judgment: Judgment normal.            Assessment/Plan       Luz Corbett was seen today for follow-up. Diagnoses and all orders for this visit:    Right flank pain  -     [facility] [place]      No follow-ups on file.                     Haritha Mireles,

## 2021-08-12 ENCOUNTER — TELEPHONE (OUTPATIENT)
Dept: FAMILY MEDICINE CLINIC | Age: 71
End: 2021-08-12

## 2021-08-12 NOTE — TELEPHONE ENCOUNTER
----- Message from Abdirahman Listen sent at 8/11/2021  5:02 PM EDT -----  Subject: Message to Provider    QUESTIONS  Information for Provider? Patient states she has been unable to reach   physicals therapy. States she would like someone from the office to call   them for her since her two messages has not been returned . ---------------------------------------------------------------------------  --------------  [facility] INFO  What is the best way for the office to contact you? OK to leave message on   voicemail  Preferred Call Back Phone Number? [phone]  ---------------------------------------------------------------------------  --------------  SCRIPT ANSWERS  Relationship to Patient?  Self

## 2021-08-23 ENCOUNTER — HOSPITAL ENCOUNTER (OUTPATIENT)
Dept: PHYSICAL THERAPY | Age: 71
Setting detail: THERAPIES SERIES
Discharge: HOME | End: 2021-08-23
Payer: MEDICARE

## 2021-08-23 PROCEDURE — 97163 PT EVAL HIGH COMPLEX 45 MIN: CPT

## 2021-08-23 PROCEDURE — 97140 MANUAL THERAPY 1/> REGIONS: CPT

## 2021-08-23 PROCEDURE — G0283 ELEC STIM OTHER THAN WOUND: HCPCS

## 2021-08-23 ASSESSMENT — PAIN SCALES - QUEBEC BACK PAIN DISABILITY SCALE
TURN OVER IN BED: 1
STAND UP FOR 20 TO 30 MINUTES: 2
WALK SEVERAL KILOMETERS  OR MILES: 2
QUEBEC DISABILITY INDEX: 1-19%
BEND OVER TO CLEAN THE BATHTUB: 1
GET OUT OF BED: 1
WALK A FEW BLOCKS OR 300 TO 400M: 0
QUEBEC CMS MODIFIER: CI
TAKE FOOD OUT OF THE REFRIGERATOR: 0
RUN ONE BLOCK OR 100M: 1
CARRY TWO BAGS OF GROCERIES: 0
RIDE IN A CAR: 0
TOTAL SCORE: 11
SLEEP THROUGH THE NIGHT: 0
SIT IN A CHAIR FOR SEVERAL HOURS: 2
MAKE YOUR BED: 0
PULL OR PUSH HEAVY DOORS: 0
PUT ON SOCKS OR PANYHOSE: 1
MOVE A CHAIR: 0
THROW A BALL: 0
CLIMB ONE FLIGHT OF STAIRS: 0
LIFT AND CARRY A HEAVY SUITCASE: 0
REACH UP TO HIGH SHELVES: 0

## 2021-08-23 NOTE — FLOWSHEET NOTE
[employer] [employer]. [address]  Phone: [phone]   Fax:     [phone]    Physical Therapy Treatment Note/ Progress Report:     Date:  2021    Patient Name:  Trinidad Gonzales    :  1950  MRN: [id number]    Pertinent Medical History:   H/O breast cancer, dizziness/vertigo    Medical/Treatment Diagnosis Information:  · Diagnosis: R10.9 (ICD-10-CM) - Right flank pain  · Treatment Diagnosis: Pain on right flank and groin, Tenderness along 12th rib, limitation of trunk rotation and lateral flexion                                           Insurance/Certification information:  PT Insurance Information: [employer] Medicare  Physician Information:  Referring Practitioner: Dr. Marilynn Calvert of care signed (Y/N): Sent to Dr. Benoit Washburn on 21     Date of Patient follow up with Physician:      Progress Report: []  Yes  [x]  No     Date Range for reporting period:  Beginnin2021  Ending:    Progress report due (10 Rx/or 30 days whichever is less):      Recertification due (POC duration/ or 90 days whichever is less):      Visit # POC/ Insurance Allowable Auth Needed   1 6 []Yes    [x]No     Functional Scale:       Date Assessed: at eval  Test: [place]  Score: 11    Pain level:  5-6/10     History of Injury:   Patient reports no incident to initiate pain on Right flank and it has improved since the beginning. The Medrol dose pack helped significantly. She does well with functional activities. Pt had CT scan on 21 which indicated \"multilevel degenerative change throughout the thoracolumbar spine. There is a mild degenerative anterolisthesis of L3. There is bilateral hip osteoarthritis. \" Pt reports that it is about the same since the dose pack  was finished. . She continues to play softball, golf and pickleball.     SUBJECTIVE:  See eval    OBJECTIVE: See eval   Observation:    Test measurements:      RESTRICTIONS/PRECAUTIONS:     Exercises/Interventions:     Therapeutic Ex (18688)   Min: Resistance/Reps Notes/Cues   Lateral Trunk rotation 2 min    Seated with right arm overhead for stretching  add   Seated on Pilates for trunk rotation  add                                 Manual Intervention (46689) Min:      Lumbar rotation, right hip distraction, MET for lumbar rotation         NMR re-education (71808)   Min:     Mf Activation- re-ed     TrA Re-ed activation     Glute Max re-ed activation          Therapeutic Activity (16524) Min:               Modalities  Min:       IFC with MHP to right lumbar area x 15 min while left sidelying No change with trunk rotation after treatment     Other Therapeutic Activities:  Pt was educated on PT POC, Diagnosis, Prognosis, pathomechanics as well as frequency and duration of scheduling future physical therapy appointments. Time was also taken on this day to answer all patient questions and participation in PT. Reviewed appointment policy in detail with patient and patient verbalized understanding. Home Exercise Program:   Access Code: [email or web address]  URL: [email or web address]  Date: 08/23/2021  Prepared by: Abdi Clarke     Exercises  Supine Lower Trunk Rotation - 1 x daily - 7 x weekly - 3 sets - 10 reps     Therapeutic Exercise and NMR EXR  [x] (58245) Provided verbal/tactile cueing for activities related to strengthening, flexibility, endurance, ROM  for improvements in proximal hip and core control with self care, mobility, lifting and ambulation.  [] (47715) Provided verbal/tactile cueing for activities related to improving balance, coordination, kinesthetic sense, posture, motor skill, proprioception  to assist with core control in self care, mobility, lifting, and ambulation.      Therapeutic Activities:    [] (87923 or 88169) Provided verbal/tactile cueing for activities related to improving balance, coordination, kinesthetic sense, posture, motor skill, proprioception and motor activation to allow for proper function  with self care and ADLs  [] (71805) Provided training and instruction to the patient for proper core and proximal hip recruitment and positioning with ambulation re-education     Home Exercise Program:    [] (93301) Reviewed/Progressed HEP activities related to strengthening, flexibility, endurance, ROM of core, proximal hip and LE for functional self-care, mobility, lifting and ambulation   [] (18743) Reviewed/Progressed HEP activities related to improving balance, coordination, kinesthetic sense, posture, motor skill, proprioception of core, proximal hip and LE for self care, mobility, lifting, and ambulation      Manual Treatments:  PROM / STM / Oscillations-Mobs:  G-I, II, III, IV (PA's, Inf., Post.)  [x] (91328) Provided manual therapy to mobilize proximal hip and LS spine soft tissue/joints for the purpose of modulating pain, promoting relaxation,  increasing ROM, reducing/eliminating soft tissue swelling/inflammation/restriction, improving soft tissue extensibility and allowing for proper ROM for normal function with self care, mobility, lifting and ambulation. Approval Dates:  CPT Code Units Approved Units Used  Date Updated:                     Charges:  Timed Code Treatment Minutes: 30   Total Treatment Minutes: 75     [] EVAL (LOW) 56731 (typically 20 minutes face-to-face)  [] EVAL (MOD) 48147 (typically 30 minutes face-to-face)  [x] EVAL (HIGH) 51284 (typically 45 minutes face-to-face)  [] RE-EVAL     [] OU(12146) x     [] Dry needle 1 or 2 Muscles (65426)  [] NMR (66290) x     [] Dry needle 3+ Muscles (33480)  [x] Manual (96204) x  1   [] Ultrasound (20646) x  [] TA (52749) x     [] Mech Traction (95441)  [] ES(attended) (22725)     [x] ES (un) (18113): 15 min  [] Vasopump (68754) [] Ionto (54189)   [] Other:    Cat Fernandez stated goal: \"Living without pain\"  []? Progressing: []? Met: []?  Not Met: []? Adjusted  Therapist goals for Patient:   Short Term Goals: To be achieved in: 2 weeks  1. Independent in HEP and progression per patient tolerance, in order to prevent re-injury. []? Progressing: []? Met: []? Not Met: []? Adjusted  2. Patient will have a decrease in pain to facilitate improvement in movement, function, and ADLs as indicated by Functional Deficits. []? Progressing: []? Met: []? Not Met: []? Adjusted     Long Term Goals: To be achieved in: 3 weeks or at Discharge  1. Disability index score of 5% or less for the [place] to assist with reaching prior level of function. []? Progressing: []? Met: []? Not Met: []? Adjusted  2. Patient will demonstrate increased AROM to WNL, good LS mobility, good hip ROM to allow for proper joint functioning as indicated by patients Functional Deficits. []? Progressing: []? Met: []? Not Met: []? Adjusted  3. Patient will demonstrate an increase in Strength to good proximal hip and core activation to allow for proper functional mobility as indicated by patients Functional Deficits. []? Progressing: []? Met: []? Not Met: []? Adjusted  4. Patient will return to 95%  functional activities without increased symptoms or restriction. []? Progressing: []? Met: []? Not Met: []? Adjusted  5. Patient will be able to rotate and laterally flex trunk without symptoms (patient specific functional goal)    []? Progressing: []? Met: []? Not Met: []? Adjusted          ASSESSMENT:  Patient has extreme tenderness along right 11th and 12th rib intercostals.  She presents as if she has two separate issues: 1) the right rib intercostal pain/tenderness and 2) the lumbar arthritic issues    Treatment/Activity Tolerance:  [] Patient tolerated treatment well [] Patient limited by fatique  [x] Patient limited by pain  [] Patient limited by other medical complications  [] Other:     Overall Progression Towards Functional goals/ Treatment Progress Update:  [] Patient is progressing as expected towards functional goals listed. [] Progression is slowed due to complexities/Impairments listed. [] Progression has been slowed due to co-morbidities. [x] Plan just implemented, too soon to assess goals progression <30days   [] Goals require adjustment due to lack of progress  [] Patient is not progressing as expected and requires additional follow up with physician  [] Other:    Prognosis for POC: [x] Good [] Fair  [] Poor    Patient requires continued skilled intervention: [x] Yes  [] No        PLAN: See eval. watch for Dr. Chamberlain Nip response on right rib pain. [] Continue per plan of care [] Alter current plan (see comments)  [x] Plan of care initiated [] Hold pending MD visit [] Discharge    Electronically signed by: Kim Barton PT    Note: If patient does not return for scheduled/recommended follow up visits, this note will serve as a discharge from care along with the most recent update on progress.

## 2021-08-23 NOTE — PROGRESS NOTES
Physical Therapy                                              [facility]. [address]  Phone: [phone]   Fax:     [phone]                                                       Physical Therapy Certification    Dear Referring Practitioner: Dr. Andres Devine,    We had the pleasure of evaluating the following patient for physical therapy services at [facility]. A summary of our findings can be found in the initial assessment below. This includes our plan of care. If you have any questions or concerns regarding these findings, please do not hesitate to contact me at the office phone number checked above. Thank you for the referral.       Physician Signature:_______________________________Date:__________________  By signing above (or electronic signature), therapists plan is approved by physician    Hi Dr. Andres Devine: I am concerned about the exquisite tenderness and pain that this patient has along the intercostal muscle between the 11th and 12th rib on the right side. Although there was no trauma reported, patient states that she practices at the batting cage and and  plays golf, both of which involve a significant amount of trunk rotation. I see that you have already ordered a CT scan, but do you think any further imaging of the right lower ribs is indicated? Also, when changing positions during therapy today, patient was affected by vertigo. She states she has never been treated for this. Would you consider a referral to therapy for treatment of her vertigo? ([facility], if you think such a referral is appropriate.) Thanks for your consideration.           Patient: Anali Mclaughlin   : 1950   MRN: [id number]  Referring Physician: Referring Practitioner: Dr. Andres Devine      Evaluation Date: 2021      Medical Diagnosis Information:  Diagnosis: R10.9 (ICD-10-CM) Flex WNL WNL    Piriformis WNL WNL    Murtaza test                Myotomes/Strength Normal Abnormal Comments   [x]ALL NORMAL      Hip Abd      Hip Ext      Hip flexion (L1-L2 femoral) [] []    Knee extension (L2-L4 femoral) [] []    Knee flexion (S1 sciatic)      Dorsiflexion (L4-L5 deep peroneal) [] []    Great Toe Ext (L5 deep peroneal nerve) [] []    Ankle Eversion (S1-S2 super peroneal) [] []    Ankle PF(S1-S2 tibial) [] []    Multifidus [] []    Transverse Ab [] []      Dermatomes Normal Abnormal Comments   [x]ALL NORMAL, except numbness on toes on both feet            inguinal area (L1)  [] []    anterior mid-thigh (L2) [] []    distal ant thigh/med knee (L3) [] []    medial lower leg and foot (L4) [] []    lateral lower leg and foot (L5) [] []    posterior calf (S1) [] []    medial calcaneus (S2) [] []      Reflexes Normal Abnormal Comments   []ALL NORMAL            S1-2 Seated achilles [] [x]    S1-2 Prone knee bend [] []    L3-4 Patellar tendon [] [x]    C5-6 Biceps [] []    C6 Brachioradialis [] []    C7-8 Triceps [] []    Clonus [] []    Babinski [] []    Quigley's [] []      Joint mobility:    []Normal    [x]Hypo - both hips and lumbar vertebral movement   []Hyper    Neurodynamics:     Orthopedic Special Tests:   Neural dynamic tension testing Normal Abnormal Comments   Slump Test  - Degree of knee flexion:  [] []    SLR  [] []    0-30 [] []    30-70 [] []    Femoral nerve (L2-4) [] []       Normal Abnormal N/A Comments   Fwd Bend-aberrant or innominate mvmt) [] [] []    Trendelenburg [] [] []    Kemps/Heladio [] [] []    Nicho Smith [] [] []    JAKE/Chirag [] [] []    Hip scour [] [] []    Supine to sit [] [] []    Prone knee bend [] [] []           Hip thrust [] [] []    SI distraction/compression [] [] []    Sacral Spring/thrust [] [] []               [x] Patient history, allergies, meds reviewed. Medical chart reviewed. See intake form.      Review Of Systems (ROS):  [x]Performed Review of systems (Integumentary, CardioPulmonary, Neurological) by intake and observation. Intake form has been scanned into medical record. Patient has been instructed to contact their primary care physician regarding ROS issues if not already being addressed at this time. Co-morbidities/Complexities (which will affect course of rehabilitation):   []None           Arthritic conditions   []Rheumatoid arthritis (M05.9)  [x]Osteoarthritis (M19.91)   Cardiovascular conditions   []Hypertension (I10)  []Hyperlipidemia (E78.5)  []Angina pectoris (I20)  []Atherosclerosis (I70)  []CVA Musculoskeletal conditions   []Disc pathology   []Congenital spine pathologies   []Prior surgical intervention  []Osteoporosis (M81.8)  []Osteopenia (M85.8)   Endocrine conditions   []Hypothyroid (E03.9)  []Hyperthyroid Gastrointestinal conditions   []Constipation (O81.55)   Metabolic conditions   []Morbid obesity (E66.01)  []Diabetes type 1(E10.65) or 2 (E11.65)   []Neuropathy (G60.9)     Pulmonary conditions   []Asthma (J45)  []Coughing   []COPD (J44.9)   Psychological Disorders  []Anxiety (F41.9)  []Depression (F32.9)   []Other:   [x]Other: Vertigo, history of breast cancer          Barriers to/and or personal factors that will affect rehab potential:              []Age  []Sex    []Smoker              []Motivation/Lack of Motivation                        []Co-Morbidities              []Cognitive Function, education/learning barriers              []Environmental, home barriers              []profession/work barriers  []past PT/medical experience  []other:  Justification:     Falls Risk Assessment (30 days):   [x] Falls Risk assessed and no intervention required.   [] Falls Risk assessed and Patient requires intervention due to being higher risk   TUG score (>12s at risk):     [] Falls education provided, including:         ASSESSMENT:   Functional Impairments:     [x]Noted lumbar/proximal hip hypomobility   []Noted lumbosacral and/or generalized hypermobility   [x]Decreased Lumbosacral/hip/LE functional ROM   []Decreased core/proximal hip strength and neuromuscular control    []Decreased LE functional strength    [x]Abnormal reflexes/sensation/myotomal/dermatomal deficits  []Reduced balance/proprioceptive control    []other:      Functional Activity Limitations (from functional questionnaire and intake)   [x]Reduced ability to tolerate prolonged functional positions   []Reduced ability or difficulty with changes of positions or transfers between positions   []Reduced ability to maintain good posture and demonstrate good body mechanics with sitting, bending, and lifting   []Reduced ability to sleep   [] Reduced ability or tolerance with driving and/or computer work   [x]Reduced ability to perform lifting, reaching, carrying tasks   []Reduced ability to squat   []Reduced ability to forward bend   []Reduced ability to ambulate prolonged functional periods/distances/surfaces   []Reduced ability to ascend/descend stairs   []other:       Participation Restrictions   []Reduced participation in self care activities   []Reduced participation in home management activities   []Reduced participation in work activities   []Reduced participation in social activities. []Reduced participation in sport/recreational activities. Classification:   []Signs/symptoms consistent with Lumbar instability/stabilization subgroup. []Signs/symptoms consistent with Lumbar mobilization/manipulation subgroup, myotomes and dermatomes intact. Meets manipulation criteria.     []Signs/symptoms consistent with Lumbar direction specific/centralization subgroup   []Signs/symptoms consistent with Lumbar traction subgroup       []Signs/symptoms consistent with lumbar facet dysfunction   [x]Signs/symptoms consistent with lumbar stenosis type dysfunction   []Signs/symptoms consistent with nerve root involvement including myotome & dermatome dysfunction   []Signs/symptoms consistent with post-surgical status including: decreased ROM, strength and function. []signs/symptoms consistent with pathology which may benefit from Dry needling     []other:      Prognosis/Rehab Potential:      []Excellent   []Good    [x]Fair   []Poor    Tolerance of evaluation/treatment:    []Excellent   [x]Good    []Fair   []Poor     Physical Therapy Evaluation Complexity Justification  [x] A history of present problem with:  [] no personal factors and/or comorbidities that impact the plan of care;  []1-2 personal factors and/or comorbidities that impact the plan of care  [x]3 personal factors and/or comorbidities that impact the plan of care  [x] An examination of body systems using standardized tests and measures addressing any of the following: body structures and functions (impairments), activity limitations, and/or participation restrictions;:  [] a total of 1-2 or more elements   [x] a total of 3 or more elements   [] a total of 4 or more elements   [x] A clinical presentation with:  [] stable and/or uncomplicated characteristics   [x] evolving clinical presentation with changing characteristics  [] unstable and unpredictable characteristics;   [x] Clinical decision making of [] low, [] moderate, [x] high complexity using standardized patient assessment instrument and/or measurable assessment of functional outcome. [] EVAL (LOW) 73649 (typically 20 minutes face-to-face)  [] EVAL (MOD) 38472 (typically 30 minutes face-to-face)  [x] EVAL (HIGH) 98758 (typically 45 minutes face-to-face)  [] RE-EVAL     PLAN: Begin PT focusing on: proximal hip mobilizations, LB mobs, LB core activation, proximal hip activation, and HEP, Seek physician's advice about right rib/intercostal muscles.     Frequency/Duration:  2 days per week for 3 Weeks:  Interventions:  [x]  Therapeutic exercise including: strength training, ROM, for LE, Glutes and core   [x]  NMR activation and proprioception for glutes , LE and Core   [x]  Manual therapy as indicated for Hip complex, LE and spine to include: Dry Needling/IASTM, STM, PROM, Gr I-IV mobilizations, manipulation. [x]  Modalities as needed that may include: thermal agents, E-stim, Biofeedback, US, iontophoresis as indicated  [x]  Patient education on joint protection, postural re-education, activity modification, progression of HEP. HEP instruction:   Access Code: [email or web address]  URL: [email or web address]  Date: 08/23/2021  Prepared by: Niko Wagner    Exercises  Supine Lower Trunk Rotation - 1 x daily - 7 x weekly - 3 sets - 10 reps    GOALS:  Patient stated goal: \"Living without pain\"  [] Progressing: [] Met: [] Not Met: [] Adjusted  Therapist goals for Patient:   Short Term Goals: To be achieved in: 2 weeks  1. Independent in HEP and progression per patient tolerance, in order to prevent re-injury. [] Progressing: [] Met: [] Not Met: [] Adjusted  2. Patient will have a decrease in pain to facilitate improvement in movement, function, and ADLs as indicated by Functional Deficits. [] Progressing: [] Met: [] Not Met: [] Adjusted    Long Term Goals: To be achieved in: 3 weeks or at Discharge  1. Disability index score of 5% or less for the [place] to assist with reaching prior level of function. [] Progressing: [] Met: [] Not Met: [] Adjusted  2. Patient will demonstrate increased AROM to WNL, good LS mobility, good hip ROM to allow for proper joint functioning as indicated by patients Functional Deficits. [] Progressing: [] Met: [] Not Met: [] Adjusted  3. Patient will demonstrate an increase in Strength to good proximal hip and core activation to allow for proper functional mobility as indicated by patients Functional Deficits. [] Progressing: [] Met: [] Not Met: [] Adjusted  4. Patient will return to 95%  functional activities without increased symptoms or restriction. [] Progressing: [] Met: [] Not Met: [] Adjusted  5.  Patient will be able to rotate and laterally flex trunk without symptoms (patient specific functional goal)    [] Progressing: [] Met: [] Not Met: [] Adjusted     Electronically signed by:  Leopoldo Vaughn PT        Note: If patient does not return for scheduled/recommended follow up visits, this note will serve as a discharge from care along with the most recent update on progress.

## 2021-08-31 ENCOUNTER — HOSPITAL ENCOUNTER (OUTPATIENT)
Dept: PHYSICAL THERAPY | Age: 71
Setting detail: THERAPIES SERIES
Discharge: HOME | End: 2021-08-31
Payer: MEDICARE

## 2021-08-31 PROCEDURE — 97140 MANUAL THERAPY 1/> REGIONS: CPT

## 2021-08-31 PROCEDURE — 97110 THERAPEUTIC EXERCISES: CPT

## 2021-08-31 PROCEDURE — G0283 ELEC STIM OTHER THAN WOUND: HCPCS

## 2021-08-31 NOTE — FLOWSHEET NOTE
[facility] [place]. [address]  Phone: [phone]   Fax:     [phone]    Physical Therapy Treatment Note/ Progress Report:     Date:  2021    Patient Name:  Mary Alice Brown    :  1950  MRN: [id number]    Pertinent Medical History:   H/O breast cancer, dizziness/vertigo    Medical/Treatment Diagnosis Information:  · Diagnosis: R10.9 (ICD-10-CM) - Right flank pain  · Treatment Diagnosis: Pain on right flank and groin, Tenderness along 12th rib, limitation of trunk rotation and lateral flexion                                           Insurance/Certification information:  PT Insurance Information: [employer] Medicare  Physician Information:  Referring Practitioner: Dr. Jazmín Adair of care signed (Y/N): Sent to Dr. Helga Landon on 21     Date of Patient follow up with Physician:      Progress Report: []  Yes  [x]  No     Date Range for reporting period:  Beginnin2021  Ending:    Progress report due (10 Rx/or 30 days whichever is less):      Recertification due (POC duration/ or 90 days whichever is less):      Visit # POC/ Insurance Allowable Auth Needed   2 6 []Yes    [x]No     Functional Scale:       Date Assessed: at eval  Test: [place]  Score: 11    Pain level:  7/10     History of Injury:   Patient reports no incident to initiate pain on Right flank and it has improved since the beginning. The Medrol dose pack helped significantly. She does well with functional activities. Pt had CT scan on 21 which indicated \"multilevel degenerative change throughout the thoracolumbar spine. There is a mild degenerative anterolisthesis of L3. There is bilateral hip osteoarthritis. \" Pt reports that it is about the same since the dose pack  was finished. . She continues to play softball, golf and pickleball.     SUBJECTIVE:  See eval  21: Pt reported that she played 8 games last wed thru Friday. She felt worse after therapy last time. OBJECTIVE: See eval   Observation:    Test measurements:      RESTRICTIONS/PRECAUTIONS:     Exercises/Interventions:     Therapeutic Ex (56140)   Min: Resistance/Reps Notes/Cues   Lateral Trunk rotation 2 min    Seated with right arm overhead for stretching 10 sec x 10 Added 8/31/21   Seated on Pilates for trunk rotation  add   Hip flexor stretch -prone , standing and supine 3 x 30 sec    Left sidelying with right leg over edge  x 30 sec                        Manual Intervention (86430) Min:      Lumbar rotation, right hip distraction,  for lumbar rotation, STM         NMR re-education (86665)   Min:     Mf Activation- re-ed     TrA Re-ed activation     Glute Max re-ed activation          Therapeutic Activity (53210) Min:               Modalities  Min:       IFC with MHP to right lumbar area x 15 min while left sidelying A little better after treatment 8/31/21     Other Therapeutic Activities:  Pt was educated on PT POC, Diagnosis, Prognosis, pathomechanics as well as frequency and duration of scheduling future physical therapy appointments. Time was also taken on this day to answer all patient questions and participation in PT. Reviewed appointment policy in detail with patient and patient verbalized understanding. Home Exercise Program:   Access Code: [email or web address]  URL: [email or web address]  Date: 08/23/2021  Prepared by: Josh Arana     Exercises  Supine Lower Trunk Rotation - 1 x daily - 7 x weekly - 3 sets - 10 reps   Access Code: [email or web address]  URL: [email or web address]  Date: 08/31/2021  Prepared by:  Josh Arana    Exercises  Seated Thoracic Flexion and Rotation with The [place] - 1 x daily - 7 x weekly - 3 sets - 10 reps  Murtaza Stretch on Table - 1 x daily - 7 x weekly - 3 sets - 10 reps  Prone Quadriceps Stretch with Strap - 1 x daily - 7 x weekly - 3 sets - 10 reps  Standing Hip Flexor Stretch on Chair - 1 x daily - 7 x weekly - 3 sets - 10 reps    Therapeutic Exercise and NMR EXR  [x] (06022) Provided verbal/tactile cueing for activities related to strengthening, flexibility, endurance, ROM  for improvements in proximal hip and core control with self care, mobility, lifting and ambulation.  [] (22714) Provided verbal/tactile cueing for activities related to improving balance, coordination, kinesthetic sense, posture, motor skill, proprioception  to assist with core control in self care, mobility, lifting, and ambulation. Therapeutic Activities:    [] (25953 or 31433) Provided verbal/tactile cueing for activities related to improving balance, coordination, kinesthetic sense, posture, motor skill, proprioception and motor activation to allow for proper function  with self care and ADLs  [] (09954) Provided training and instruction to the patient for proper core and proximal hip recruitment and positioning with ambulation re-education     Home Exercise Program:    [] (48070) Reviewed/Progressed HEP activities related to strengthening, flexibility, endurance, ROM of core, proximal hip and LE for functional self-care, mobility, lifting and ambulation   [] (08020) Reviewed/Progressed HEP activities related to improving balance, coordination, kinesthetic sense, posture, motor skill, proprioception of core, proximal hip and LE for self care, mobility, lifting, and ambulation      Manual Treatments:  PROM / STM / Oscillations-Mobs:  G-I, II, III, IV (PA's, Inf., Post.)  [x] (32664) Provided manual therapy to mobilize proximal hip and LS spine soft tissue/joints for the purpose of modulating pain, promoting relaxation,  increasing ROM, reducing/eliminating soft tissue swelling/inflammation/restriction, improving soft tissue extensibility and allowing for proper ROM for normal function with self care, mobility, lifting and ambulation.      Approval Dates:  CPT Code Units Approved Units Used  Date Updated: Charges:  Timed Code Treatment Minutes: 36   Total Treatment Minutes: 55     [] EVAL (LOW) 57762 (typically 20 minutes face-to-face)  [] EVAL (MOD) 04862 (typically 30 minutes face-to-face)  [] EVAL (HIGH) 94891 (typically 45 minutes face-to-face)  [] RE-EVAL     [x] PE(38792) x   1 [] Dry needle 1 or 2 Muscles (55679)  [] NMR (34610) x     [] Dry needle 3+ Muscles (64327)  [x] Manual (98513) x  1   [] Ultrasound (94470) x  [] TA (69872) x     [] Mech Traction (38125)  [] ES(attended) (14650)     [x] ES (un) (29083): 15 min  [] Vasopump (63425) [] Ionto (22582)   [] Other:    Kenneth Whitaker stated goal: \"Living without pain\"  []? Progressing: []? Met: []? Not Met: []? Adjusted  Therapist goals for Patient:   Short Term Goals: To be achieved in: 2 weeks  1. Independent in HEP and progression per patient tolerance, in order to prevent re-injury. []? Progressing: []? Met: []? Not Met: []? Adjusted  2. Patient will have a decrease in pain to facilitate improvement in movement, function, and ADLs as indicated by Functional Deficits. []? Progressing: []? Met: []? Not Met: []? Adjusted     Long Term Goals: To be achieved in: 3 weeks or at Discharge  1. Disability index score of 5% or less for the [place] to assist with reaching prior level of function. []? Progressing: []? Met: []? Not Met: []? Adjusted  2. Patient will demonstrate increased AROM to WNL, good LS mobility, good hip ROM to allow for proper joint functioning as indicated by patients Functional Deficits. []? Progressing: []? Met: []? Not Met: []? Adjusted  3. Patient will demonstrate an increase in Strength to good proximal hip and core activation to allow for proper functional mobility as indicated by patients Functional Deficits. []? Progressing: []? Met: []? Not Met: []? Adjusted  4. Patient will return to 95%  functional activities without increased symptoms or restriction. []? Progressing: []? Met: []? Not Met: []? Adjusted  5.  Patient will

## 2021-09-02 ENCOUNTER — HOSPITAL ENCOUNTER (OUTPATIENT)
Dept: PHYSICAL THERAPY | Age: 71
Setting detail: THERAPIES SERIES
Discharge: HOME | End: 2021-09-02
Payer: MEDICARE

## 2021-09-02 ENCOUNTER — TELEPHONE (OUTPATIENT)
Dept: FAMILY MEDICINE CLINIC | Age: 71
End: 2021-09-02

## 2021-09-02 DIAGNOSIS — R07.89 CHEST WALL PAIN: Primary | ICD-10-CM

## 2021-09-02 PROCEDURE — 97110 THERAPEUTIC EXERCISES: CPT

## 2021-09-02 PROCEDURE — 97140 MANUAL THERAPY 1/> REGIONS: CPT

## 2021-09-02 PROCEDURE — G0283 ELEC STIM OTHER THAN WOUND: HCPCS

## 2021-09-02 NOTE — FLOWSHEET NOTE
[facility] [place]. [address]  Phone: [phone]   Fax:     [phone]    Physical Therapy Treatment Note/ Progress Report:     Date:  2021    Patient Name:  Suad Garcia    :  1950  MRN: [id number]    Pertinent Medical History:   H/O breast cancer, dizziness/vertigo    Medical/Treatment Diagnosis Information:  · Diagnosis: R10.9 (ICD-10-CM) - Right flank pain  · Treatment Diagnosis: Pain on right flank and groin, Tenderness along 12th rib, limitation of trunk rotation and lateral flexion                                           Insurance/Certification information:  PT Insurance Information: [facility]  Physician Information:  Referring Practitioner: Dr. Liliam Carroll of care signed (Y/N): Sent to Dr. Twyla Marroquin on 21     Date of Patient follow up with Physician:      Progress Report: []  Yes  [x]  No     Date Range for reporting period:  Beginnin2021  Ending:    Progress report due (10 Rx/or 30 days whichever is less):      Recertification due (POC duration/ or 90 days whichever is less):      Visit # POC/ Insurance Allowable Auth Needed   3 6 []Yes    [x]No     Functional Scale:       Date Assessed: at eval  Test: [place]  Score: 11    Pain level:  4-5/10     History of Injury:   Patient reports no incident to initiate pain on Right flank and it has improved since the beginning. The Medrol dose pack helped significantly. She does well with functional activities. Pt had CT scan on 21 which indicated \"multilevel degenerative change throughout the thoracolumbar spine. There is a mild degenerative anterolisthesis of L3. There is bilateral hip osteoarthritis. \" Pt reports that it is about the same since the dose pack  was finished. . She continues to play softball, golf and pickleball.     SUBJECTIVE:  See eval  21: Pt reported that she played 8 games last wed thru Friday. She felt worse after therapy last time. 09/02/21: Patient reports back is feeling a little better,ribs still hurting some. OBJECTIVE: See eval   Observation:    Test measurements:      RESTRICTIONS/PRECAUTIONS:     Exercises/Interventions:     Therapeutic Ex (42553)   Min: Resistance/Reps Notes/Cues   Lateral Trunk rotation 2 min    Seated with right arm overhead for stretching 10 sec x 10 Added 8/31/21   Seated on Pilates for trunk rotation 2 sprg x 15 each Added 9/02/21   Hip flexor stretch -prone , standing and supine 3 x 30 sec    Left sidelying with right leg over edge  x 30 sec    nustep L 3 x 5 min                   Manual Intervention (75246) Min:      Lumbar rotation, right hip distraction,  for lumbar rotation, STM         NMR re-education (75377)   Min:     Mf Activation- re-ed     TrA Re-ed activation     Glute Max re-ed activation          Therapeutic Activity (26957) Min:               Modalities  Min:       IFC with MHP to right lumbar area x 15 min while left sidelying A little better after treatment 8/31/21     Other Therapeutic Activities:  Pt was educated on PT POC, Diagnosis, Prognosis, pathomechanics as well as frequency and duration of scheduling future physical therapy appointments. Time was also taken on this day to answer all patient questions and participation in PT. Reviewed appointment policy in detail with patient and patient verbalized understanding. Home Exercise Program:   Access Code: [email or web address]  URL: [email or web address]  Date: 08/23/2021  Prepared by: Billie Michaud     Exercises  Supine Lower Trunk Rotation - 1 x daily - 7 x weekly - 3 sets - 10 reps   Access Code: [email or web address]  URL: [email or web address]  Date: 08/31/2021  Prepared by:  Billie Michaud    Exercises  Seated Thoracic Flexion and Rotation with The [place] - 1 x daily - 7 x weekly - 3 sets - 10 reps  Murtaza Stretch on Table - 1 x daily - 7 x weekly - 3 sets - 10 reps  Prone Quadriceps Stretch with Strap - 1 x daily - 7 x weekly - 3 sets - 10 reps  Standing Hip Flexor Stretch on Chair - 1 x daily - 7 x weekly - 3 sets - 10 reps    Therapeutic Exercise and NMR EXR  [x] (17284) Provided verbal/tactile cueing for activities related to strengthening, flexibility, endurance, ROM  for improvements in proximal hip and core control with self care, mobility, lifting and ambulation.  [] (95939) Provided verbal/tactile cueing for activities related to improving balance, coordination, kinesthetic sense, posture, motor skill, proprioception  to assist with core control in self care, mobility, lifting, and ambulation.      Therapeutic Activities:    [] (10169 or 91239) Provided verbal/tactile cueing for activities related to improving balance, coordination, kinesthetic sense, posture, motor skill, proprioception and motor activation to allow for proper function  with self care and ADLs  [] (32696) Provided training and instruction to the patient for proper core and proximal hip recruitment and positioning with ambulation re-education     Home Exercise Program:    [] (52221) Reviewed/Progressed HEP activities related to strengthening, flexibility, endurance, ROM of core, proximal hip and LE for functional self-care, mobility, lifting and ambulation   [] (27019) Reviewed/Progressed HEP activities related to improving balance, coordination, kinesthetic sense, posture, motor skill, proprioception of core, proximal hip and LE for self care, mobility, lifting, and ambulation      Manual Treatments:  PROM / STM / Oscillations-Mobs:  G-I, II, III, IV (PA's, Inf., Post.)  [x] (14437) Provided manual therapy to mobilize proximal hip and LS spine soft tissue/joints for the purpose of modulating pain, promoting relaxation,  increasing ROM, reducing/eliminating soft tissue swelling/inflammation/restriction, improving soft tissue extensibility and allowing for proper ROM for normal function with self care, mobility, lifting and ambulation. Approval Dates:  CPT Code Units Approved Units Used  Date Updated:                     Charges:  Timed Code Treatment Minutes: 40   Total Treatment Minutes: 55     [] EVAL (LOW) 62867 (typically 20 minutes face-to-face)  [] EVAL (MOD) 81476 (typically 30 minutes face-to-face)  [] EVAL (HIGH) 79302 (typically 45 minutes face-to-face)  [] RE-EVAL     [x] UD(26959) x   1 [] Dry needle 1 or 2 Muscles (52319)  [] NMR (88233) x     [] Dry needle 3+ Muscles (51519)  [x] Manual (23701) x  1   [] Ultrasound (67235) x  [] TA (11477) x     [] Mech Traction (81102)  [] ES(attended) (44876)     [x] ES (un) (49006): 15 min  [] Vasopump (36064) [] Ionto (87718)   [] Other:    Toro Nunez stated goal: \"Living without pain\"  []? Progressing: []? Met: []? Not Met: []? Adjusted  Therapist goals for Patient:   Short Term Goals: To be achieved in: 2 weeks  1. Independent in HEP and progression per patient tolerance, in order to prevent re-injury. []? Progressing: []? Met: []? Not Met: []? Adjusted  2. Patient will have a decrease in pain to facilitate improvement in movement, function, and ADLs as indicated by Functional Deficits. []? Progressing: []? Met: []? Not Met: []? Adjusted     Long Term Goals: To be achieved in: 3 weeks or at Discharge  1. Disability index score of 5% or less for the [place] to assist with reaching prior level of function. []? Progressing: []? Met: []? Not Met: []? Adjusted  2. Patient will demonstrate increased AROM to WNL, good LS mobility, good hip ROM to allow for proper joint functioning as indicated by patients Functional Deficits. []? Progressing: []? Met: []? Not Met: []? Adjusted  3. Patient will demonstrate an increase in Strength to good proximal hip and core activation to allow for proper functional mobility as indicated by patients Functional Deficits. []? Progressing: []? Met: []? Not Met: []? Adjusted  4.  Patient will return to 95%  functional activities without increased symptoms or restriction. []? Progressing: []? Met: []? Not Met: []? Adjusted  5. Patient will be able to rotate and laterally flex trunk without symptoms (patient specific functional goal)    []? Progressing: []? Met: []? Not Met: []? Adjusted          ASSESSMENT:  Patient has extreme tenderness along right 11th and 12th rib intercostals. She presents as if she has two separate issues: 1) the right rib intercostal pain/tenderness and 2) the lumbar arthritic issues    Treatment/Activity Tolerance:  [] Patient tolerated treatment well [] Patient limited by fatique  [x] Patient limited by pain  [] Patient limited by other medical complications  [] Other:     Overall Progression Towards Functional goals/ Treatment Progress Update:  [] Patient is progressing as expected towards functional goals listed. [] Progression is slowed due to complexities/Impairments listed. [] Progression has been slowed due to co-morbidities. [x] Plan just implemented, too soon to assess goals progression <30days   [] Goals require adjustment due to lack of progress  [] Patient is not progressing as expected and requires additional follow up with physician  [] Other:    Prognosis for POC: [x] Good [] Fair  [] Poor    Patient requires continued skilled intervention: [x] Yes  [] No        PLAN: See eval. watch for Dr. Mark Ortiz response on right rib pain. [] Continue per plan of care [] Alter current plan (see comments)  [x] Plan of care initiated [] Hold pending MD visit [] Discharge    Electronically signed by: Mark Banks PTA    Note: If patient does not return for scheduled/recommended follow up visits, this note will serve as a discharge from care along with the most recent update on progress.

## 2021-09-07 ENCOUNTER — HOSPITAL ENCOUNTER (OUTPATIENT)
Dept: PHYSICAL THERAPY | Age: 71
Setting detail: THERAPIES SERIES
Discharge: HOME | End: 2021-09-07
Payer: MEDICARE

## 2021-09-07 PROCEDURE — 97110 THERAPEUTIC EXERCISES: CPT

## 2021-09-07 PROCEDURE — 97140 MANUAL THERAPY 1/> REGIONS: CPT

## 2021-09-07 PROCEDURE — G0283 ELEC STIM OTHER THAN WOUND: HCPCS

## 2021-09-07 NOTE — FLOWSHEET NOTE
[facility] [place]. [address]  Phone: [phone]   Fax:     [phone]    Physical Therapy Treatment Note/ Progress Report:     Date:  2021    Patient Name:  Sofy Rivas    :  1950  MRN: [id number]    Pertinent Medical History:   H/O breast cancer, dizziness/vertigo    Medical/Treatment Diagnosis Information:  · Diagnosis: R10.9 (ICD-10-CM) - Right flank pain  · Treatment Diagnosis: Pain on right flank and groin, Tenderness along 12th rib, limitation of trunk rotation and lateral flexion                                           Insurance/Certification information:  PT Insurance Information: Aetna Medicare  Physician Information:  Referring Practitioner: Dr. Courtney Trivedi of care signed (Y/N): Sent to Dr. Jose Francisco Stockton on 21     Date of Patient follow up with Physician:      Progress Report: []  Yes  [x]  No     Date Range for reporting period:  Beginnin2021  Ending:    Progress report due (10 Rx/or 30 days whichever is less):      Recertification due (POC duration/ or 90 days whichever is less):      Visit # POC/ Insurance Allowable Auth Needed   4 6 []Yes    [x]No     Functional Scale:       Date Assessed: at eval  Test: Agip U. 96.  Score: 11    Pain level:  3/10 on flank , but Right rib area is just the same. History of Injury:   Patient reports no incident to initiate pain on Right flank and it has improved since the beginning. The Medrol dose pack helped significantly. She does well with functional activities. Pt had CT scan on 21 which indicated \"multilevel degenerative change throughout the thoracolumbar spine. There is a mild degenerative anterolisthesis of L3. There is bilateral hip osteoarthritis. \" Pt reports that it is about the same since the dose pack  was finished. . She continues to play softball, golf and pickleball.     SUBJECTIVE:  See eval  8/31/21: Pt reported that she played 8 games last wed thru Friday. She felt worse after therapy last time. 09/02/21: Patient reports back is feeling a little better,ribs still hurting some. 9/7/21: Pt reports that she is about 60% better on right flank. Rib areas is the same and still painful. OBJECTIVE: See eval   Observation:    Test measurements:      RESTRICTIONS/PRECAUTIONS:     Exercises/Interventions:     Therapeutic Ex (03382)   Min: Resistance/Reps Notes/Cues   Lateral Trunk rotation 2 min    Seated with right arm overhead for stretching 10 sec x 10 Added 8/31/21   Seated on Pilates for trunk rotation 2 sprg x 15 each Added 9/02/21   Hip flexor stretch -prone , standing and supine 3 x 30 sec    Left sidelying with right leg over edge  x 30 sec    nustep L 3 x 5 min                   Manual Intervention (50662) Min:      Lumbar rotation, right hip distraction,  for lumbar rotation, STM         NMR re-education (53749)   Min:     Mf Activation- re-ed     TrA Re-ed activation     Glute Max re-ed activation          Therapeutic Activity (50127) Min:               Modalities  Min:       IFC with MHP to right lumbar area x 15 min while left sidelying A little better after treatment 8/31/21     Other Therapeutic Activities:  Pt was educated on PT POC, Diagnosis, Prognosis, pathomechanics as well as frequency and duration of scheduling future physical therapy appointments. Time was also taken on this day to answer all patient questions and participation in PT. Reviewed appointment policy in detail with patient and patient verbalized understanding. Home Exercise Program:   Access Code: [email or web address]  URL: [email or web address]. [email or web address]  Date: 08/23/2021  Prepared by: Cat Larose     Exercises  Supine Lower Trunk Rotation - 1 x daily - 7 x weekly - 3 sets - 10 reps   Access Code: [email or web address]  URL: [email or web address]. [employer]/  Date: 08/31/2021  Prepared by:  Cat Larose    Exercises  Seated Thoracic Flexion and Rotation with Swiss Ball - 1 x daily - 7 x weekly - 3 sets - 10 reps  Murtaza Stretch on Table - 1 x daily - 7 x weekly - 3 sets - 10 reps  Prone Quadriceps Stretch with Strap - 1 x daily - 7 x weekly - 3 sets - 10 reps  Standing Hip Flexor Stretch on Chair - 1 x daily - 7 x weekly - 3 sets - 10 reps    Therapeutic Exercise and NMR EXR  [x] (16300) Provided verbal/tactile cueing for activities related to strengthening, flexibility, endurance, ROM  for improvements in proximal hip and core control with self care, mobility, lifting and ambulation.  [] (78625) Provided verbal/tactile cueing for activities related to improving balance, coordination, kinesthetic sense, posture, motor skill, proprioception  to assist with core control in self care, mobility, lifting, and ambulation.      Therapeutic Activities:    [] (61122 or 58342) Provided verbal/tactile cueing for activities related to improving balance, coordination, kinesthetic sense, posture, motor skill, proprioception and motor activation to allow for proper function  with self care and ADLs  [] (11187) Provided training and instruction to the patient for proper core and proximal hip recruitment and positioning with ambulation re-education     Home Exercise Program:    [] (89120) Reviewed/Progressed HEP activities related to strengthening, flexibility, endurance, ROM of core, proximal hip and LE for functional self-care, mobility, lifting and ambulation   [] (95655) Reviewed/Progressed HEP activities related to improving balance, coordination, kinesthetic sense, posture, motor skill, proprioception of core, proximal hip and LE for self care, mobility, lifting, and ambulation      Manual Treatments:  PROM / STM / Oscillations-Mobs:  G-I, II, III, IV (PA's, Inf., Post.)  [x] (81717) Provided manual therapy to mobilize proximal hip and LS spine soft tissue/joints for the purpose of modulating pain, promoting relaxation,  increasing ROM, reducing/eliminating soft tissue swelling/inflammation/restriction, improving soft tissue extensibility and allowing for proper ROM for normal function with self care, mobility, lifting and ambulation. Approval Dates:  CPT Code Units Approved Units Used  Date Updated:                     Charges:  Timed Code Treatment Minutes: 45   Total Treatment Minutes: 60     [] EVAL (LOW) 25187 (typically 20 minutes face-to-face)  [] EVAL (MOD) 76221 (typically 30 minutes face-to-face)  [] EVAL (HIGH) 67796 (typically 45 minutes face-to-face)  [] RE-EVAL     [x] JU(56965) x   2 [] Dry needle 1 or 2 Muscles (43580)  [] NMR (52815) x     [] Dry needle 3+ Muscles (59876)  [x] Manual (66534) x  1   [] Ultrasound (06430) x  [] TA (58762) x     [] Mech Traction (07724)  [] ES(attended) (41896)     [x] ES (un) (48565): 15 min  [] Vasopump (29131) [] Ionto (94035)   [] Other:    Reba Buck stated goal: \"Living without pain\"  []? Progressing: []? Met: []? Not Met: []? Adjusted  Therapist goals for Patient:   Short Term Goals: To be achieved in: 2 weeks  1. Independent in HEP and progression per patient tolerance, in order to prevent re-injury. []? Progressing: []? Met: []? Not Met: []? Adjusted  2. Patient will have a decrease in pain to facilitate improvement in movement, function, and ADLs as indicated by Functional Deficits. []? Progressing: []? Met: []? Not Met: []? Adjusted     Long Term Goals: To be achieved in: 3 weeks or at Discharge  1. Disability index score of 5% or less for the [place] to assist with reaching prior level of function. []? Progressing: []? Met: []? Not Met: []? Adjusted  2. Patient will demonstrate increased AROM to WNL, good LS mobility, good hip ROM to allow for proper joint functioning as indicated by patients Functional Deficits. []? Progressing: []? Met: []? Not Met: []? Adjusted  3.  Patient will demonstrate an increase in Strength to good proximal hip and core activation to allow for proper functional mobility as indicated by patients Functional Deficits. []? Progressing: []? Met: []? Not Met: []? Adjusted  4. Patient will return to 95%  functional activities without increased symptoms or restriction. []? Progressing: []? Met: []? Not Met: []? Adjusted  5. Patient will be able to rotate and laterally flex trunk without symptoms (patient specific functional goal)    []? Progressing: []? Met: []? Not Met: []? Adjusted          ASSESSMENT:  Patient has extreme tenderness along right 11th and 12th rib intercostals. She presents as if she has two separate issues: 1) the right rib intercostal pain/tenderness and 2) the lumbar arthritic issues    Treatment/Activity Tolerance:  [] Patient tolerated treatment well [] Patient limited by fatique  [x] Patient limited by pain  [] Patient limited by other medical complications  [] Other:     Overall Progression Towards Functional goals/ Treatment Progress Update:  [] Patient is progressing as expected towards functional goals listed. [] Progression is slowed due to complexities/Impairments listed. [] Progression has been slowed due to co-morbidities. [x] Plan just implemented, too soon to assess goals progression <30days   [] Goals require adjustment due to lack of progress  [] Patient is not progressing as expected and requires additional follow up with physician  [] Other:    Prognosis for POC: [x] Good [] Fair  [] Poor    Patient requires continued skilled intervention: [x] Yes  [] No        PLAN: See eval. watch for Dr. Sabina Mg response on right rib pain. [x] Continue per plan of care [] Alter current plan (see comments)  [] Plan of care initiated [] Hold pending MD visit [] Discharge    Electronically signed by: Sasha Arana PT    Note: If patient does not return for scheduled/recommended follow up visits, this note will serve as a discharge from care along with the most recent update on progress.

## 2021-09-09 ENCOUNTER — HOSPITAL ENCOUNTER (OUTPATIENT)
Dept: PHYSICAL THERAPY | Age: 71
Setting detail: THERAPIES SERIES
Discharge: HOME | End: 2021-09-09
Payer: MEDICARE

## 2021-09-09 PROCEDURE — 97140 MANUAL THERAPY 1/> REGIONS: CPT

## 2021-09-09 PROCEDURE — G0283 ELEC STIM OTHER THAN WOUND: HCPCS

## 2021-09-09 PROCEDURE — 97110 THERAPEUTIC EXERCISES: CPT

## 2021-09-09 NOTE — FLOWSHEET NOTE
[facility] [place]. [address]  Phone: [phone]   Fax:     [phone]    Physical Therapy Treatment Note/ Progress Report:     Date:  2021    Patient Name:  Tyler Koch    :  1950  MRN: [id number]    Pertinent Medical History:   H/O breast cancer, dizziness/vertigo    Medical/Treatment Diagnosis Information:  · Diagnosis: R10.9 (ICD-10-CM) - Right flank pain  · Treatment Diagnosis: Pain on right flank and groin, Tenderness along 12th rib, limitation of trunk rotation and lateral flexion                                           Insurance/Certification information:  PT Insurance Information: Aetna Medicare  Physician Information:  Referring Practitioner: Dr. Nikia Mariscal of care signed (Y/N): Sent to Dr. Crystal Pederson on 21     Date of Patient follow up with Physician:      Progress Report: []  Yes  [x]  No     Date Range for reporting period:  Beginnin2021  Ending:    Progress report due (10 Rx/or 30 days whichever is less):      Recertification due (POC duration/ or 90 days whichever is less):      Visit # POC/ Insurance Allowable Auth Needed   5 6 []Yes    [x]No     Functional Scale:       Date Assessed: at eval  Test: [place]  Score: 11    Pain level:  3/10 on flank , but Right rib area is just the same. History of Injury:   Patient reports no incident to initiate pain on Right flank and it has improved since the beginning. The Medrol dose pack helped significantly. She does well with functional activities. Pt had CT scan on 21 which indicated \"multilevel degenerative change throughout the thoracolumbar spine. There is a mild degenerative anterolisthesis of L3. There is bilateral hip osteoarthritis. \" Pt reports that it is about the same since the dose pack  was finished. . She continues to play softball, golf and pickleball.     SUBJECTIVE:  See eval  8/31/21: Pt reported that she played 8 games last wed thru Friday. She felt worse after therapy last time. 09/02/21: Patient reports back is feeling a little better,ribs still hurting some. 9/7/21: Pt reports that she is about 60% better on right flank. Rib areas is the same and still painful. 09/09/21: Patient reports back is feeling better,rib area still about the same and only tender to touch. 09/09/21:   OBJECTIVE: See eval   Observation:    Test measurements:      RESTRICTIONS/PRECAUTIONS:     Exercises/Interventions:     Therapeutic Ex (60282)   Min: Resistance/Reps Notes/Cues   Lateral Trunk rotation 2 min    Seated with right arm overhead for stretching 10 sec x 10 Added 8/31/21   Seated on Pilates for trunk rotation 2 sprg x 15 each Added 9/02/21   Hip flexor stretch -prone , standing and supine 3 x 30 sec    Left sidelying with right leg over edge  x 30 sec    nustep L 3 x 5 min                   Manual Intervention (68103) Min:      Lumbar rotation, right hip distraction,  for lumbar rotation, STM         NMR re-education (30898)   Min:     Mf Activation- re-ed     TrA Re-ed activation     Glute Max re-ed activation          Therapeutic Activity (98582) Min:               Modalities  Min:       IFC with MHP to right lumbar area x 15 min while left sidelying A little better after treatment 8/31/21     Other Therapeutic Activities:  Pt was educated on PT POC, Diagnosis, Prognosis, pathomechanics as well as frequency and duration of scheduling future physical therapy appointments. Time was also taken on this day to answer all patient questions and participation in PT. Reviewed appointment policy in detail with patient and patient verbalized understanding. Home Exercise Program:   Access Code: [email or web address]  URL: [email or web address]. [employer]/  Date: 08/23/2021  Prepared by:  [facility]     Exercises  Supine Lower Trunk Rotation - 1 x daily - 7 x weekly - 3 sets - 10 reps   Access Code: [email or web address]  URL: [employer]. com/  Date: 08/31/2021  Prepared by: Wilda Davies    Exercises  Seated Thoracic Flexion and Rotation with [employer] [place] - 1 x daily - 7 x weekly - 3 sets - 10 reps  Murtaza Stretch on Table - 1 x daily - 7 x weekly - 3 sets - 10 reps  Prone Quadriceps Stretch with Strap - 1 x daily - 7 x weekly - 3 sets - 10 reps  Standing Hip Flexor Stretch on Chair - 1 x daily - 7 x weekly - 3 sets - 10 reps    Therapeutic Exercise and NMR EXR  [x] (41566) Provided verbal/tactile cueing for activities related to strengthening, flexibility, endurance, ROM  for improvements in proximal hip and core control with self care, mobility, lifting and ambulation.  [] (02720) Provided verbal/tactile cueing for activities related to improving balance, coordination, kinesthetic sense, posture, motor skill, proprioception  to assist with core control in self care, mobility, lifting, and ambulation.      Therapeutic Activities:    [] (75947 or 69820) Provided verbal/tactile cueing for activities related to improving balance, coordination, kinesthetic sense, posture, motor skill, proprioception and motor activation to allow for proper function  with self care and ADLs  [] (40990) Provided training and instruction to the patient for proper core and proximal hip recruitment and positioning with ambulation re-education     Home Exercise Program:    [] (84805) Reviewed/Progressed HEP activities related to strengthening, flexibility, endurance, ROM of core, proximal hip and LE for functional self-care, mobility, lifting and ambulation   [] (73227) Reviewed/Progressed HEP activities related to improving balance, coordination, kinesthetic sense, posture, motor skill, proprioception of core, proximal hip and LE for self care, mobility, lifting, and ambulation      Manual Treatments:  PROM / STM / Oscillations-Mobs:  G-I, II, III, IV (PA's, Inf., Post.)  [x] (77096) Provided manual therapy to mobilize proximal hip and LS spine soft tissue/joints for the purpose of modulating pain, promoting relaxation,  increasing ROM, reducing/eliminating soft tissue swelling/inflammation/restriction, improving soft tissue extensibility and allowing for proper ROM for normal function with self care, mobility, lifting and ambulation. Approval Dates:  CPT Code Units Approved Units Used  Date Updated:                     Charges:  Timed Code Treatment Minutes: 45   Total Treatment Minutes: 60     [] EVAL (LOW) 99236 (typically 20 minutes face-to-face)  [] EVAL (MOD) 53004 (typically 30 minutes face-to-face)  [] EVAL (HIGH) 53231 (typically 45 minutes face-to-face)  [] RE-EVAL     [x] IY(75737) x   2 [] Dry needle 1 or 2 Muscles (30779)  [] NMR (11932) x     [] Dry needle 3+ Muscles (19650)  [x] Manual (14746) x  1   [] Ultrasound (25274) x  [] TA (03715) x     [] Mech Traction (35939)  [] ES(attended) (28575)     [x] ES (un) (37402): 15 min  [] Vasopump (01612) [] Ionto (70195)   [] Other:    Lacretia Florentinker stated goal: \"Living without pain\"  []? Progressing: []? Met: []? Not Met: []? Adjusted  Therapist goals for Patient:   Short Term Goals: To be achieved in: 2 weeks  1. Independent in HEP and progression per patient tolerance, in order to prevent re-injury. []? Progressing: []? Met: []? Not Met: []? Adjusted  2. Patient will have a decrease in pain to facilitate improvement in movement, function, and ADLs as indicated by Functional Deficits. []? Progressing: []? Met: []? Not Met: []? Adjusted     Long Term Goals: To be achieved in: 3 weeks or at Discharge  1. Disability index score of 5% or less for the [place] to assist with reaching prior level of function. []? Progressing: []? Met: []? Not Met: []? Adjusted  2. Patient will demonstrate increased AROM to WNL, good LS mobility, good hip ROM to allow for proper joint functioning as indicated by patients Functional Deficits. []? Progressing: []? Met: []? Not Met: []? Adjusted  3. Patient will demonstrate an increase in Strength to good proximal hip and core activation to allow for proper functional mobility as indicated by patients Functional Deficits. []? Progressing: []? Met: []? Not Met: []? Adjusted  4. Patient will return to 95%  functional activities without increased symptoms or restriction. []? Progressing: []? Met: []? Not Met: []? Adjusted  5. Patient will be able to rotate and laterally flex trunk without symptoms (patient specific functional goal)    []? Progressing: []? Met: []? Not Met: []? Adjusted          ASSESSMENT:  Patient has extreme tenderness along right 11th and 12th rib intercostals. She presents as if she has two separate issues: 1) the right rib intercostal pain/tenderness and 2) the lumbar arthritic issues    Treatment/Activity Tolerance:  [x] Patient tolerated treatment well [] Patient limited by fatique  [] Patient limited by pain  [] Patient limited by other medical complications  [] Other:     Overall Progression Towards Functional goals/ Treatment Progress Update:  [] Patient is progressing as expected towards functional goals listed. [] Progression is slowed due to complexities/Impairments listed. [] Progression has been slowed due to co-morbidities. [x] Plan just implemented, too soon to assess goals progression <30days   [] Goals require adjustment due to lack of progress  [] Patient is not progressing as expected and requires additional follow up with physician  [] Other:    Prognosis for POC: [x] Good [] Fair  [] Poor    Patient requires continued skilled intervention: [x] Yes  [] No        PLAN: See eval. watch for Dr. Eron Navarro response on right rib pain.   [x] Continue per plan of care [] Alter current plan (see comments)  [] Plan of care initiated [] Hold pending MD visit [] Discharge    Electronically signed by: King Back PTA    Note: If patient does not return for scheduled/recommended follow up visits, this note will serve as a discharge from care along with the most recent update on progress.

## 2021-09-14 ENCOUNTER — HOSPITAL ENCOUNTER (OUTPATIENT)
Dept: PHYSICAL THERAPY | Age: 71
Setting detail: THERAPIES SERIES
Discharge: HOME | End: 2021-09-14
Payer: MEDICARE

## 2021-09-14 PROCEDURE — G0283 ELEC STIM OTHER THAN WOUND: HCPCS

## 2021-09-14 PROCEDURE — 97530 THERAPEUTIC ACTIVITIES: CPT

## 2021-09-14 PROCEDURE — 97140 MANUAL THERAPY 1/> REGIONS: CPT

## 2021-09-14 PROCEDURE — 97110 THERAPEUTIC EXERCISES: CPT

## 2021-09-14 NOTE — FLOWSHEET NOTE
[facility]. [address]  Phone: [phone]   Fax:     [phone]    Physical Therapy Treatment Note/ Progress Report:     Date:  2021    Patient Name:  Isma Carrera    :  1950  MRN: [id number]    Pertinent Medical History:   H/O breast cancer, dizziness/vertigo    Medical/Treatment Diagnosis Information:  · Diagnosis: R10.9 (ICD-10-CM) - Right flank pain  · Treatment Diagnosis: Pain on right flank and groin, Tenderness along 12th rib, limitation of trunk rotation and lateral flexion                                           Insurance/Certification information:  PT Insurance Information: [facility] Medicare  Physician Information:  Referring Practitioner: Dr. Candelario Bai of care signed (Y/N): Sent to Dr. Yanique Hernandez on 21     Date of Patient follow up with Physician:      Progress Report: []  Yes  [x]  No     Date Range for reporting period:  Beginnin2021  Endin21    Progress report due (10 Rx/or 30 days whichever is less):      Recertification due (POC duration/ or 90 days whichever is less):      Visit # POC/ Insurance Allowable Auth Needed   6 6 []Yes    [x]No     Functional Scale:       Date Assessed: at eval  Test: [place]  Score: 11    Pain level:  3/10 on flank , but Right rib area is just the same. History of Injury:   Patient reports no incident to initiate pain on Right flank and it has improved since the beginning. The Medrol dose pack helped significantly. She does well with functional activities. Pt had CT scan on 21 which indicated \"multilevel degenerative change throughout the thoracolumbar spine. There is a mild degenerative anterolisthesis of L3. There is bilateral hip osteoarthritis. \" Pt reports that it is about the same since the dose pack  was finished. . She continues to play softball, golf and pickleball.     SUBJECTIVE:  See eval  8/31/21: Pt reported that she played 8 games last wed thru Friday. She felt worse after therapy last time. 09/02/21: Patient reports back is feeling a little better,ribs still hurting some. 9/7/21: Pt reports that she is about 60% better on right flank. Rib areas is the same and still painful. 09/09/21: Patient reports back is feeling better,rib area still about the same and only tender to touch.  09/14/21: Pt reports that she is 75%-80% better on flank area. Will have MRI on rib area next week. OBJECTIVE: See eval   Observation:    Test measurements: At end of PT today, Patient had full trunk rotation without pain on flank    RESTRICTIONS/PRECAUTIONS:     Exercises/Interventions:     Therapeutic Ex (61801)   Min: Resistance/Reps Notes/Cues   Lateral Trunk rotation 2 min    S 10 sec x 10 Added 8/31/21   Seated on Pilates for trunk rotation 2 sprg x 15 each Added 9/02/21   3 x 30 sec      x 30 sec    nustep L 3 x 5 min                   Manual Intervention (05802) Min:      Lumbar rotation, right hip distraction,  for lumbar rotation, STM         NMR re-education (84042)   Min:     Mf Activation- re-ed     TrA Re-ed activation     Glute Max re-ed activation          Therapeutic Activity (66124) Min:      9/14/21: review of goals, discussion of her current complaints and best approach moving forward         Modalities  Min:       IFC with MHP to right lumbar area x 15 min while left sidelying A little better after treatment 8/31/21     Other Therapeutic Activities:  Pt was educated on PT POC, Diagnosis, Prognosis, pathomechanics as well as frequency and duration of scheduling future physical therapy appointments. Time was also taken on this day to answer all patient questions and participation in PT. Reviewed appointment policy in detail with patient and patient verbalized understanding. Home Exercise Program:   Access Code: [email or web address]  URL: [email or web address]  Date: 08/23/2021  Prepared by: Cat [place]     Exercises  Supine Lower Trunk Rotation - 1 x daily - 7 x weekly - 3 sets - 10 reps   Access Code: [email or web address]  URL: [email or web address]  Date: 08/31/2021  Prepared by: Cat [place]    Exercises  Seated Thoracic Flexion and Rotation with The [place] - 1 x daily - 7 x weekly - 3 sets - 10 reps  Murtaza Stretch on Table - 1 x daily - 7 x weekly - 3 sets - 10 reps  Prone Quadriceps Stretch with Strap - 1 x daily - 7 x weekly - 3 sets - 10 reps  Standing Hip Flexor Stretch on Chair - 1 x daily - 7 x weekly - 3 sets - 10 reps    Therapeutic Exercise and NMR EXR  [x] (33299) Provided verbal/tactile cueing for activities related to strengthening, flexibility, endurance, ROM  for improvements in proximal hip and core control with self care, mobility, lifting and ambulation.  [] (63820) Provided verbal/tactile cueing for activities related to improving balance, coordination, kinesthetic sense, posture, motor skill, proprioception  to assist with core control in self care, mobility, lifting, and ambulation.      Therapeutic Activities:    [] (73256 or 13143) Provided verbal/tactile cueing for activities related to improving balance, coordination, kinesthetic sense, posture, motor skill, proprioception and motor activation to allow for proper function  with self care and ADLs  [] (75904) Provided training and instruction to the patient for proper core and proximal hip recruitment and positioning with ambulation re-education     Home Exercise Program:    [] (91753) Reviewed/Progressed HEP activities related to strengthening, flexibility, endurance, ROM of core, proximal hip and LE for functional self-care, mobility, lifting and ambulation   [] (47315) Reviewed/Progressed HEP activities related to improving balance, coordination, kinesthetic sense, posture, motor skill, proprioception of core, proximal hip and LE for self care, mobility, lifting, and ambulation      Manual Treatments:  PROM / STM / Oscillations-Mobs:  G-I, II, III, IV (PA's, Inf., Post.)  [x] (52022) Provided manual therapy to mobilize proximal hip and LS spine soft tissue/joints for the purpose of modulating pain, promoting relaxation,  increasing ROM, reducing/eliminating soft tissue swelling/inflammation/restriction, improving soft tissue extensibility and allowing for proper ROM for normal function with self care, mobility, lifting and ambulation. Approval Dates:  CPT Code Units Approved Units Used  Date Updated:                     Charges:  Timed Code Treatment Minutes: 45   Total Treatment Minutes: 60     [] EVAL (LOW) 67197 (typically 20 minutes face-to-face)  [] EVAL (MOD) 84998 (typically 30 minutes face-to-face)  [] EVAL (HIGH) 20929 (typically 45 minutes face-to-face)  [] RE-EVAL     [x] RE(41569) x   1 [] Dry needle 1 or 2 Muscles (18885)  [] NMR (15930) x     [] Dry needle 3+ Muscles (08465)  [x] Manual (08184) x  1   [] Ultrasound (38262) x  [x] TA (30022) x1   [] Mech Traction (18438)  [] ES(attended) (66125)     [x] ES (un) (64935): 15 min  [] Vasopump (55212) [] Ionto (85717)   [] Other:    Solange Mckeon stated goal: \"Living without pain\"  [x]? Progressing: []? Met: []? Not Met: []? Adjusted  Therapist goals for Patient:   Short Term Goals: To be achieved in: 2 weeks  1. Independent in HEP and progression per patient tolerance, in order to prevent re-injury. []? Progressing: [x]? Met: []? Not Met: []? Adjusted  2. Patient will have a decrease in pain to facilitate improvement in movement, function, and ADLs as indicated by Functional Deficits. []? Progressing: [x]? Met: []? Not Met: []? Adjusted     Long Term Goals: To be achieved in: 3 weeks or at Discharge  1. Disability index score of 5% or less for the [place] to assist with reaching prior level of function. [x]? Progressing: []? Met: []? Not Met: []? Adjusted  2.  Patient will demonstrate increased AROM to WNL, good LS mobility, good hip ROM to allow for was denied, but Dr. Benoit Washburn will do a peer to peer review about this. . Patient is interested in more PT sessions to focus on Right flank pain and then more on lumbar spine. Request an additional 8 sessions (2X weekly x 4 weeks). Patient now has orders for vertigo and PT contacted patient to advise her to call QC and also spoke  with Lily Boone at [facility] to let her know patient will be calling. [] Plan of care initiated [] Hold pending MD visit [] Discharge    Electronically signed by: Mary Coyne PT    Note: If patient does not return for scheduled/recommended follow up visits, this note will serve as a discharge from care along with the most recent update on progress.

## 2021-09-16 ENCOUNTER — TELEPHONE (OUTPATIENT)
Dept: FAMILY MEDICINE CLINIC | Age: 71
End: 2021-09-16

## 2021-09-16 DIAGNOSIS — R42 VERTIGO: ICD-10-CM

## 2021-09-16 DIAGNOSIS — M54.50 LUMBOSACRAL PAIN: Primary | ICD-10-CM

## 2021-09-16 NOTE — TELEPHONE ENCOUNTER
[facility] PA department called regarding approval for MRI. They stated that the PA department has started a the PA and has sent all information to [id number] [address]. [id number] is needing a peer to peer with Dr Mateusz Cartagena for approval.     How should this proceed?     [id number] [address] [phone]  Case # [id number]

## 2021-10-08 ENCOUNTER — HOSPITAL ENCOUNTER (OUTPATIENT)
Dept: PHYSICAL THERAPY | Age: 71
Setting detail: THERAPIES SERIES
Discharge: HOME | End: 2021-10-08
Payer: MEDICARE

## 2021-10-08 PROCEDURE — 97161 PT EVAL LOW COMPLEX 20 MIN: CPT

## 2021-10-08 PROCEDURE — 97112 NEUROMUSCULAR REEDUCATION: CPT

## 2021-10-08 PROCEDURE — 97530 THERAPEUTIC ACTIVITIES: CPT

## 2021-10-08 NOTE — PLAN OF CARE
K[address] 76, [facility]  [address] [place] [facility]  Phone: [phone]   Fax:     [phone]                                                       Physical Therapy Certification    Dear Referring Practitioner: Dr. Barr Last,    We had the pleasure of evaluating the following patient for physical therapy services at [address]. A summary of our findings can be found in the initial assessment below. This includes our plan of care. If you have any questions or concerns regarding these findings, please do not hesitate to contact me at the office phone number checked above. Thank you for the referral.       Physician Signature:_______________________________Date:__________________  By signing above (or electronic signature), therapists plan is approved by physician          Patient: Rhea Mckay   : 1950   MRN: [id number]  Referring Physician: Referring Practitioner: Dr. Barr Last      Evaluation Date: 10/8/2021        Medical Diagnosis Information:  Diagnosis: R42 vertigo   Treatment Diagnosis: dizziness limiting balance, gait and ADLs                                           Insurance information: PT Insurance Information: Aetna Medicare     Precautions/ Contra-indications:   Latex Allergy:  [x]NO      []YES  Preferred Language for Healthcare:   [x]English       []other:    C-SSRS Triggered by Intake questionnaire (Past 2 wk assessment ):   [x] No, Questionnaire did not trigger screening.   [] Yes, Patient intake triggered C-SSRS Screening      [] C-SSRS Screening completed  [] PCP notified via Epic     SUBJECTIVE: Patient stated complaint: Pt notes dizziness began about 4-5 months ago, when she was getting into bed and experienced vertigo with nausea. She plays softball, but does note vertigo and visual disturbances with looking up to catch ball.      Relevant Medical History: Additional Pertinent Hx: breast cancer 8 yrs ago  Pain Scale: 0/10      Type: []Constant   [x]Intermittent  []Radiating []Localized []other:    Dizziness Scale: 1-9/10 ranges from very mild to extremely bad and had a day one day spent 6 hours in bed    Description of symptoms: [x] Vertigo []  Off-balance [] Lightheadedness    Symptoms are getting:  [x] Better [] Worse  [] Same      [] Episodic    Description of Spells: [] Constant [] Spontaneous [x] Motion Induced [x] Induced by position changes [] Other:    Length of time spells occur: [x] Seconds [] Minutes  [] Hours [] Days [] Other:     Date of onset: 6/1/21    Setting in which symptoms first occurred: rolling in bed     What increases/provokes symptoms? Positional changes; looking up     What decreases/eases symptoms?  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      Living Status/Prior Level of Function: Prior to this injury / incident, patient was independent with ADLs and IADLs,     OBJECTIVE:     Musculoskeletal Screen  Cervical spine complaints: none  Cervical Pain: none  Cervical spine ROM:  [x]  WNL [] Impaired:      Posture: WFL     Somatosensory:NT  Light touch:  [] Normal [] Impaired Comments:    Coordination:NT  Rapid alternating movements: [] Normal [] Dysdiadokinesia   Finger to Nose:   [] Normal [] Dysmetric  Heel to Shin:    [] Normal [] Ataxic    Postural Control Tests:  Clinical Test of Sensory Interaction for Balance (CTSIB) performed in Romberg stance  CONDITION TIME STRATEGY SWAY    Eyes open, firm surface 10 sec  none Eyes closed, firm surface 10 sec   none    Eyes open, foam surface NT      Eyes closed, foam surface NT       Tandem stance: WNL - 10 sec L/R; mild sway     Gait:    Assistive Device: no     Orthosis: no   At self-initiated pace:  Katrin: WNL   SESAR: WNL        Arm swing: WNL   Head/trunk rotation: dec      Straight path: yes  Swerves: no      Staggers: no   Side-steps: no   Gait speed: WNL    Oculomotor/Vestibular Examination: NT due to + positional testing followed by treatment     Spontaneous nystagmus:  [] Left  [] Right [] Absent  Gaze-Evoked nystagmus with fixation present:   Primary [] Present [] Absent   Right  [] Present [] Absent   Left  [] Present [] Absent  VOR Head Thrust Test: [] Normal [] Abnormal  Comments:   VOR Cancellation:  [] Normal [] Abnormal Comments:   Smooth Pursuit:  [] Normal [] Abnormal Comments:   Saccades:   [] Normal [] Abnormal Comments:   Convergence:   [] Normal [] Abnormal Comments:   Static Visual Acuity:    Dynamic Visual Acuity:    Positional Testing  R Hallpike-Feliciano maneuver:    Nystagmus:  [] Yes  [x] No  [] Duration:       [] Direction:    Vertigo:  [] Yes  [x] No  [] Duration:   L Hallpike-Feliciano maneuver:   Nystagmus:  [x] Yes  [] No  [x] Duration: 15-20 sec       [x] Direction: upbeating to the left    Vertigo:  [x] Yes  [] No  [x] Duration:  15-20 sec   Supine roll head right:   Nystagmus:  [] Yes  [x] No  [] Duration:       [] Direction:    Vertigo:  [] Yes  [x] No  [] Duration:   Supine roll head left:   Nystagmus:  [] Yes  [x] No  [] Duration:       [] Direction:    Vertigo:  [] Yes  [x] No  [] Duration:     Outcome Measures  Dizziness Handicap Index (DHI)    22  Functional Gait Assessment (FGA)      Sahni Balance Scale        Timed Get \"Up and Go\"       5 time sit-to-stand        Activities Specific Balance Confidence Scale (ABC)    Motion Sensitivity Quotient (MSQ)      Visual Vertigo Analogue Scale (VVAS)       [x] Patient history, allergies, meds reviewed.  Medical chart reviewed. See intake form. Review of Systems (ROS):  [x]Performed Review of systems (Integumentary, Cardiopulmonary, Neurological) by intake and observation. Intake form has been scanned into medical record. Patient has been instructed to contact their primary care physician regarding ROS issues if not already being addressed at this time.       Co-morbidities/Complexities (which will affect course of rehabilitation):   [x]None           Arthritic conditions   []Rheumatoid arthritis (M05.9)  []Osteoarthritis (M19.91)   Cardiovascular conditions   []Hypertension (I10)  []Hyperlipidemia (E78.5)  []Angina pectoris (I20)  []Atherosclerosis (I70)   Musculoskeletal conditions   []Disc pathology   []Congenital spine pathologies   []Prior surgical intervention  []Osteoporosis (M81.8)  []Osteopenia (M85.8)   Endocrine conditions   []Hypothyroid (E03.9)  []Hyperthyroid Gastrointestinal conditions   []Constipation (Z72.96)   Metabolic conditions   []Morbid obesity (E66.01)  []Diabetes type 1(E10.65) or 2 (E11.65)   []Neuropathy (G60.9)     Pulmonary conditions   []Asthma (J45)  []Coughing   []COPD (J44.9)   Psychological Disorders  []Anxiety (F41.9)  []Depression (F32.9)   []Other:   []Other:     H/o Breast cancer       Barriers to/and or personal factors that will affect rehab potential:              []Age  []Sex    []Smoker              []Motivation/Lack of Motivation                        []Co-Morbidities              []Cognitive Function, education/learning barriers              []Environmental, home barriers              []profession/work barriers  []past PT/medical experience  []other:  Justification:     ASSESSMENT:      Functional Impairments:  Problem List/Functional Limitations:   [x] BPPV    [] Right [x] Posterior Canal [x] Canalithiasis    [x] Left  [] Horizontal Canal [] Cupulolithiasis   [] Decreased Gaze Stabilization    [] Increased Motion Sensitivity   [] Unilateral Vestibular Hypofunction [] Bilateral Vestibular Hypofunction   [] Gait Instability    [] Decreased tolerance for ADLs    [] Decreased functional strength   [] Reduced Balance/Proprioceptive control   [] Reduced ability to hear/focus   [] Noted cervical/thoracic/GHJ joint hypomobility   [] Noted cervical/thoracic/GHJ joint hypermobility   [] Decreased cervical/UE functional ROM   [] Noted Headache pain aggravated by neck movements with/without dizziness   [] Abnormal reflexes/sensation/myotomal/dermatomal deficits   [] Decreased DCF control or ability to hold head up   [] Decreased RC/scapular/core strength and neuromuscular control     Functional Activity Limitations (from functional questionnaire and intake)   []Reduced ability to tolerate prolonged functional positions   [x]Reduced ability or difficulty with changes of positions or transfers between positions  [x]Reduced ability to transfer in/out of bed or rolling in bed   []Reduced ability or tolerance with driving, reading and/or computer work   []Reduced ability to perform lifting, reaching, carrying tasks   []Reduced ability to forward bend   []Reduced ability to ambulate prolonged functional periods/distances/surfaces   []Reduced ability to ascend/descend stairs  []Reduced ability to concentrate/focus  []Reduced ability to turn/pitch head rapidly  []Reduced ability to self-correct for losses of balance []other:       Participation Restrictions   [x]Reduced participation in self-care activities   [x]Reduced participation in home management activities   [x]Reduced participation in work activities   [x]Reduced participation in social activities   []Reduced participation in sport/recreational activities    Classification:   [x]Signs/symptoms consistent with BPPV (benign paroxysmal positional vertigo)      []Signs/symptoms consistent with unilateral peripheral vestibulopathy (i.e., vestibular neuritis, labyrinthitis, acoustic neuroma)   []Signs/symptoms consistent with central vestibulopathy  []Signs/symptoms consistent with migraine-related vestibulopathy  []Signs/symptoms consistent with Meniere's disease / post-traumatic hydrops  []Signs/symptoms consistent with perilymphatic fistula   []Signs/symptoms consistent with cervicogenic dizziness  []Signs/symptoms consistent with gait instability   []Signs/symptoms consistent with motion sensitivity    []signs/symptoms consistent with neck pain with mobility deficits     []signs/symptoms consistent with neck pain with movement coordinated impairments    []signs/symptoms consistent with neck pain with radiating pain    []signs/symptoms consistent with neck pain with headaches (cervicogenic)    []Signs/symptoms consistent with nerve root involvement including myotome & dermatome dysfunction   []sign/symptoms consistent with facet dysfunction of cervical and thoracic spine    []signs/symptoms consistent suggesting central cord compression/UMN syndromes   []signs/symptoms consistent with discogenic cervical pain   []signs/symptoms consistent with rib dysfunction   []signs/symptoms consistent with postural dysfunction   []signs/symptoms consistent with shoulder pathology    []signs/symptoms consistent with post-surgical status including decreased ROM, strength and function.    []signs/symptoms consistent with pathology which may benefit from Dry Needling  []signs/symptoms which may limit the use of advanced manual therapy techniques: (Elevated CV risk profile, recent trauma, intolerance to end range positions, prior TIA, visual issues, UE neurological compromise)   []other:      Prognosis/Rehab Potential:      []Excellent   [x]Good    []Fair   []Poor    Tolerance of evaluation/treatment:    []Excellent   [x]Good    []Fair   []Poor     Physical Therapy Evaluation Complexity Justification  [x] A history of present problem with:  [] no personal factors and/or comorbidities that impact the plan of care;  [x]1-2 personal factors and/or comorbidities that impact the plan of care  []3 personal factors and/or comorbidities that impact the plan of care  [x] An examination of body systems using standardized tests and measures addressing any of the following: body structures and functions (impairments), activity limitations, and/or participation restrictions;:  [x] a total of 1-2 or more elements   [] a total of 3 or more elements   [] a total of 4 or more elements   [x] A clinical presentation with:  [x] stable and/or uncomplicated characteristics   [] evolving clinical presentation with changing characteristics  [] unstable and unpredictable characteristics;   [x] Clinical decision making of [x] low, [] moderate, [] high complexity using standardized patient assessment instrument and/or measurable assessment of functional outcome. [x] EVAL (LOW) 67005 (typically 15 minutes face-to-face)  [] EVAL (MOD) 68080 (typically 30 minutes face-to-face)  [] EVAL (HIGH) 98589 (typically 45 minutes face-to-face)  [] RE-EVAL     HEP instruction: Written HEP instructions provided and reviewed. GOALS:  Patient stated goal: \"no dizziness\"  [x] Progressing: [] Met: [] Not Met: [] Adjusted    Therapist goals for Patient:   Short Term Goals: To be achieved in: 2 weeks  1. Independent in HEP and progression per patient tolerance, in order to prevent re-injury. [x] Progressing: [] Met: [] Not Met: [] Adjusted  2. Patient will have a decrease in dizziness/imbalance/symptoms by 30-40% to facilitate improvement in movement, function, balance and ADLs as indicated by Functional Deficits. [x] Progressing: [] Met: [] Not Met: [] Adjusted    Long Term Goals: To be achieved in at discharge  1. Disability index score of 11 or less for the [address] [place] [address] to assist with reaching prior level of function. [x] Progressing: [] Met: [] Not Met: [] Adjusted   2.  Patient will have a decrease in dizziness/imbalance/symptoms by 70-80% to facilitate improvement in movement, function, balance and ADLs as indicated by Functional Deficits. [x] Progressing: [] Met: [] Not Met: [] Adjusted  3. Patient will demonstrate negative oculomotor special testing/positional testing as indicated by patients Functional Deficits. [x] Progressing: [] Met: [] Not Met: [] Adjusted  4. Patient will return to functional activities including bed mobility without increased symptoms or restriction. [x] Progressing: [] Met: [] Not Met: [] Adjusted  5. \"Bed mobility and softball without dizziness\"   [x] Progressing: [] Met: [] Not Met: [] Adjusted     PLAN:   Today's Treatment:    [x] See flowsheet   [] Patient treated with canalith repositioning maneuver   [] Education materials provided on BPPV/Vestibular Dysfunction/Habituation   [] Precautions provided and patient to follow precautions for next 24 hours in regards to BPPV management   [] Written home exercise instructions   [] Other:    Frequency/Duration:  2 days per week for 4 Weeks:  Interventions:  [x]  Therapeutic exercise including: strength training, ROM, for LEs, cervical spine & UEs  [x]  NMR activation and proprioception for BLEs, vestibular training/habituation, balance, coordination  [x]  Manual therapy as indicated via: canalith repositioning maneuvers, Dry Needling/IASTM, STM, PROM, Gr I-IV mobilizations, manipulation. [x]  Modalities as needed that may include: thermal agents, E-stim, Biofeedback, US, iontophoresis as indicated  [x]  Gait training  [x]  Patient education on BPPV/vestibular function, balance, postural re-education, activity modification, progression of HEP. Electronically signed by:  Jesús Emery, PT  MPT [id number]  Note: If patient does not return for scheduled/recommended follow up visits, this note will serve as a discharge from care along with the most recent update on progress.

## 2021-10-08 NOTE — FLOWSHEET NOTE
[place] [facility], [facility]  [address] [facility] [place], [facility]  Phone: [phone]   Fax:     [phone]      Physical Therapy Treatment Note/ Progress Report:   Date:  10/8/2021    Patient Name:  Yosvany Stratton    :  1950  MRN: [id number]    Pertinent Medical History: Additional Pertinent Hx: breast cancer 8 yrs ago    Medical/Treatment Diagnosis Information:  · Diagnosis: R42 vertigo  · Treatment Diagnosis: dizziness limiting balance, gait and ADLs    Insurance/Certification information:  PT Insurance Information: Aetna Medicare  Physician Information:  Referring Practitioner: Dr. Nadeem Jennings of care signed (Y/N): []  Yes [x]  No     Date of Patient follow up with Physician:      Progress Report: []  Yes  [x]  No     Date Range for reporting period:  Beginning: 10/8/2021  Ending:     Progress report due (10 Rx/or 30 days whichever is less):      Recertification due (POC duration/ or 90 days whichever is less):      Visit # Insurance Allowable Auth required? Date Range    [place]  []  Yes [x]  No      Latex Allergy:  [x]NO      []YES  Preferred Language for Healthcare:   [x]English       []other:    Functional Outcomes Measure:   Date Assessed:  Test:DHI  Score:22    Pain level:  0/10   Dizziness level 1-9/10 varies    History of Injury:Patient stated complaint: Pt notes dizziness began about 4-5 months ago, when she was getting into bed and experienced vertigo with nausea.  She plays softball, but does note vertigo and visual disturbances with looking up to catch ball.        SUBJECTIVE:  See eval    OBJECTIVE: See eval      RESTRICTIONS/PRECAUTIONS:     Exercises/Interventions:     Therapeutic Exercises (54603) Min: Resistance/Reps Notes/Cues                            Therapeutic Activities (69730) Min:10 See below                             Neuromuscular Re-ed (38327) Min:15     Josr Repositioning Procedure  Fred Gaw + for L posterior canalithiasis   Treated x 1 with Epley  Pt was treated with Epley and then checked for R Feliciano Glasgow and NADIA Alejandre which were both negative; when pt was about to go into the L Fred Gaw for retest and possible 2nd Epley pt began to c/o nausea, dizziness, sweating and \"fogginess\" w/ HA (\"like I have a hang over\"). Pt needed several minutes to sit and rest with PT monitoring/supervision before feeling better. Pt and PT declined to retest and treat again for L post canalithiasis b/c pt had to drive herself home today              Manual Intervention (85451) Min:                Modalities  Min:                      Other Therapeutic Activities: Pt was educated on PT POC, Diagnosis, Prognosis, pathomechanics as well as frequency and duration of scheduling future physical therapy appointments. Time was also taken on this day to answer all patient questions and participation in PT. Reviewed appointment policy in detail with patient and patient verbalized understanding. X 10 min     Home Exercise Program: Patient was instructed in the following for HEP:     . Patient verbalized/demonstrated understanding and was issued written handout. Therapeutic Exercise and NMR EXR  [x] (46739) Provided verbal/tactile cueing for activities related to strengthening, flexibility, endurance, ROM for improvements in LE, proximal hip, and core control with self care, mobility, lifting, ambulation.  [] (07634) Provided verbal/tactile cueing for activities related to improving balance, coordination, kinesthetic sense, posture, motor skill, proprioception  to assist with LE, proximal hip, and core control in self care, mobility, lifting, ambulation and eccentric single leg control.  [address] and Therapeutic Activities:    [x] (29272 or 81240) Provided verbal/tactile cueing for activities related to improving balance, coordination, kinesthetic sense, posture, motor skill, dizziness\"  [x]? Progressing: []? Met: []? Not Met: []? Adjusted     Therapist goals for Patient:   Short Term Goals: To be achieved in: 2 weeks  1. Independent in HEP and progression per patient tolerance, in order to prevent re-injury. [x]? Progressing: []? Met: []? Not Met: []? Adjusted  2. Patient will have a decrease in dizziness/imbalance/symptoms by 30-40% to facilitate improvement in movement, function, balance and ADLs as indicated by Functional Deficits. [x]? Progressing: []? Met: []? Not Met: []? Adjusted     Long Term Goals: To be achieved in at discharge  1. Disability index score of 11 or less for the [address] [place] [address] to assist with reaching prior level of function. [x]? Progressing: []? Met: []? Not Met: []? Adjusted   2. Patient will have a decrease in dizziness/imbalance/symptoms by 70-80% to facilitate improvement in movement, function, balance and ADLs as indicated by Functional Deficits. [x]? Progressing: []? Met: []? Not Met: []? Adjusted  3. Patient will demonstrate negative oculomotor special testing/positional testing as indicated by patients Functional Deficits. [x]? Progressing: []? Met: []? Not Met: []? Adjusted  4. Patient will return to functional activities including bed mobility without increased symptoms or restriction. [x]? Progressing: []? Met: []? Not Met: []? Adjusted  5. \"Bed mobility and softball without dizziness\"   [x]? Progressing: []? Met: []? Not Met: []? Adjusted         ASSESSMENT:  See eval    Treatment/Activity Tolerance:  [x] Patient tolerated treatment well [] Patient limited by fatique  [] Patient limited by pain  [] Patient limited by other medical complications  [] Other:     Overall Progression Towards Functional goals/ Treatment Progress Update:  [] Patient is progressing as expected towards functional goals listed. [] Progression is slowed due to complexities/Impairments listed. [] Progression has been slowed due to co-morbidities.   [x] Plan just implemented, too soon to assess goals progression <30days   [] Goals require adjustment due to lack of progress  [] Patient is not progressing as expected and requires additional follow up with physician  [] Other    Prognosis for POC: [x] Good [] Fair  [] Poor    Patient requires continued skilled intervention: [x] Yes  [] No        PLAN: Recheck [place] Hallpike/positional testing   [] Continue per plan of care [] Alter current plan (see comments)  [x] Plan of care initiated [] Hold pending MD visit [] Discharge    Electronically signed by: Melvi Colon, PT MPT [id number]    Note: If patient does not return for scheduled/recommended follow up visits, this note will serve as a discharge from care along with the most recent update on progress.

## 2021-10-19 ENCOUNTER — HOSPITAL ENCOUNTER (OUTPATIENT)
Dept: PHYSICAL THERAPY | Age: 71
Setting detail: THERAPIES SERIES
Discharge: HOME | End: 2021-10-19
Payer: MEDICARE

## 2021-10-19 PROCEDURE — 97112 NEUROMUSCULAR REEDUCATION: CPT

## 2021-10-19 PROCEDURE — 97530 THERAPEUTIC ACTIVITIES: CPT

## 2021-10-19 NOTE — FLOWSHEET NOTE
[place] [facility], [facility]  [address] [facility] [facility] [place], [facility]  Phone: [phone]   Fax:     [phone]      Physical Therapy Treatment Note/ Progress Report:   Date:  10/19/2021    Patient Name:  Finn Reese    :  1950  MRN: [id number]    Pertinent Medical History: Additional Pertinent Hx: breast cancer 8 yrs ago    Medical/Treatment Diagnosis Information:  · Diagnosis: R42 vertigo  · Treatment Diagnosis: dizziness limiting balance, gait and ADLs    Insurance/Certification information:  PT Insurance Information: Aetna Medicare  Physician Information:  Referring Practitioner: Dr. Arvind Scott of care signed (Y/N): []  Yes [x]  No     Date of Patient follow up with Physician:      Progress Report: []  Yes  [x]  No     Date Range for reporting period:  Beginning: 10/8/2021  Ending:     Progress report due (10 Rx/or 30 days whichever is less):  15/7/43    Recertification due (POC duration/ or 90 days whichever is less):      Visit # Insurance Allowable Auth required? Date Range   2/5 [place]  []  Yes [x]  No      Latex Allergy:  [x]NO      []YES  Preferred Language for Healthcare:   [x]English       []other:    Functional Outcomes Measure:   Date Assessed:  Test:DHI  Score:22    Pain level:  0/10   Dizziness level 1-9/10 varies    History of Injury:Patient stated complaint: Pt notes dizziness began about 4-5 months ago, when she was getting into bed and experienced vertigo with nausea. She plays softball, but does note vertigo and visual disturbances with looking up to catch ball.        SUBJECTIVE:    10/19: felt bad for about 1 day after eval; still noticing intermittent dizziness ( although somewhat less) with certain positions like looking up     OBJECTIVE:   Positional Testing: updated 10/19  R Hallpike-Waverly maneuver:  NT              Nystagmus:     []? Yes             [x]?  No               []? Duration: appointments. Time was also taken on this day to answer all patient questions and participation in PT. Reviewed appointment policy in detail with patient and patient verbalized understanding. X 10 min     Home Exercise Program: Patient was instructed in the following for HEP:     . Patient verbalized/demonstrated understanding and was issued written handout. 10/19: Deann Bode, self Epley     Therapeutic Exercise and NMR EXR  [x] (28287) Provided verbal/tactile cueing for activities related to strengthening, flexibility, endurance, ROM for improvements in LE, proximal hip, and core control with self care, mobility, lifting, ambulation.  [] (25337) Provided verbal/tactile cueing for activities related to improving balance, coordination, kinesthetic sense, posture, motor skill, proprioception  to assist with LE, proximal hip, and core control in self care, mobility, lifting, ambulation and eccentric single leg control.  [address] and Therapeutic Activities:    [x] (00460 or 19825) Provided verbal/tactile cueing for activities related to improving balance, coordination, kinesthetic sense, posture, motor skill, proprioception and motor activation to allow for proper function of core, proximal hip and LE with self care and ADLs and functional mobility.   [] (55808) Gait Re-education- Provided training and instruction to the patient for proper LE, core and proximal hip recruitment and positioning and eccentric body weight control with ambulation re-education including up and down stairs     Home Exercise Program:    [x] (14848) Reviewed/Progressed HEP activities related to strengthening, flexibility, endurance, ROM of core, proximal hip and LE for functional self-care, mobility, lifting and ambulation/stair navigation   [] (21005)Reviewed/Progressed HEP activities related to improving balance, coordination, kinesthetic sense, posture, motor skill, proprioception of core, proximal hip and LE for self care, mobility, lifting, and ambulation/stair navigation      Manual Treatments:  PROM / STM / Oscillations-Mobs:  G-I, II, III, IV (PA's, Inf., Post.)  [] (53133) Provided manual therapy to mobilize LE, proximal hip and/or LS spine soft tissue/joints for the purpose of modulating pain, promoting relaxation,  increasing ROM, reducing/eliminating soft tissue swelling/inflammation/restriction, improving soft tissue extensibility and allowing for proper ROM for normal function with self care, mobility, lifting and ambulation. Charges:  Timed Code Treatment Minutes: 35   Total Treatment Minutes: 35      [] EVAL (LOW) 28460 (typically 20 minutes face-to-face)  [] EVAL (MOD) 91538 (typically 30 minutes face-to-face)  [] EVAL (HIGH) 06260 (typically 45 minutes face-to-face)  [] RE-EVAL     [] FC(62062) x     [] Dry needle 1 or 2 Muscles (81285)  [x] NMR (85502) x    [] Dry needle 3+ Muscles (34475)  [] Manual (86992) x     [] Ultrasound (66462) x  [x] TA (72622) x     [] Mech Traction (07894)  [] ES(attended) (20859)     [] ES (un) (54770):   [] Vasopump (78309) [] Ionto (81659)   [] Other:    GOALS:  Patient stated goal: \"no dizziness\"  [x]? Progressing: []? Met: []? Not Met: []? Adjusted     Therapist goals for Patient:   Short Term Goals: To be achieved in: 2 weeks  1. Independent in HEP and progression per patient tolerance, in order to prevent re-injury. [x]? Progressing: []? Met: []? Not Met: []? Adjusted  2. Patient will have a decrease in dizziness/imbalance/symptoms by 30-40% to facilitate improvement in movement, function, balance and ADLs as indicated by Functional Deficits. [x]? Progressing: []? Met: []? Not Met: []? Adjusted     Long Term Goals: To be achieved in at discharge  1. Disability index score of 11 or less for the [facility] to assist with reaching prior level of function. [x]? Progressing: []? Met: []? Not Met: []? Adjusted   2.  Patient will have a decrease in dizziness/imbalance/symptoms by 70-80% to facilitate improvement in movement, function, balance and ADLs as indicated by Functional Deficits. [x]? Progressing: []? Met: []? Not Met: []? Adjusted  3. Patient will demonstrate negative oculomotor special testing/positional testing as indicated by patients Functional Deficits. [x]? Progressing: []? Met: []? Not Met: []? Adjusted  4. Patient will return to functional activities including bed mobility without increased symptoms or restriction. [x]? Progressing: []? Met: []? Not Met: []? Adjusted  5. \"Bed mobility and softball without dizziness\"   [x]? Progressing: []? Met: []? Not Met: []? Adjusted         ASSESSMENT:  See eval    Treatment/Activity Tolerance:  [x] Patient tolerated treatment well [] Patient limited by fatique  [] Patient limited by pain  [] Patient limited by other medical complications  [] Other:     Overall Progression Towards Functional goals/ Treatment Progress Update:  [] Patient is progressing as expected towards functional goals listed. [] Progression is slowed due to complexities/Impairments listed. [] Progression has been slowed due to co-morbidities. [x] Plan just implemented, too soon to assess goals progression <30days   [] Goals require adjustment due to lack of progress  [] Patient is not progressing as expected and requires additional follow up with physician  [] Other    Prognosis for POC: [x] Good [] Fair  [] Poor    Patient requires continued skilled intervention: [x] Yes  [] No        PLAN: Recheck Feliciano Hallpike/positional testing if needed    [x] Continue per plan of care [] Alter current plan (see comments)  [] Plan of care initiated [] Hold pending MD visit [] Discharge    Electronically signed by: Pipe Macias, PT MPT [id number]    Note: If patient does not return for scheduled/recommended follow up visits, this note will serve as a discharge from care along with the most recent update on progress.

## 2021-10-21 ENCOUNTER — HOSPITAL ENCOUNTER (OUTPATIENT)
Dept: PHYSICAL THERAPY | Age: 71
Setting detail: THERAPIES SERIES
Discharge: HOME | End: 2021-10-21
Payer: MEDICARE

## 2021-10-21 PROCEDURE — 97530 THERAPEUTIC ACTIVITIES: CPT

## 2021-10-21 PROCEDURE — 97112 NEUROMUSCULAR REEDUCATION: CPT

## 2021-10-21 NOTE — FLOWSHEET NOTE
[facility]  [address] [facility]  Phone: [phone]   Fax:     [phone]      Physical Therapy Treatment Note/ Progress Report:   Date:  10/21/2021    Patient Name:  Baldev De Leon    :  1950  MRN: [id number]    Pertinent Medical History: Additional Pertinent Hx: breast cancer 8 yrs ago    Medical/Treatment Diagnosis Information:  · Diagnosis: R42 vertigo  · Treatment Diagnosis: dizziness limiting balance, gait and ADLs    Insurance/Certification information:  PT Insurance Information: Aetna Medicare  Physician Information:  Referring Practitioner: Dr. Rosalio Martinez of care signed (Y/N): []  Yes [x]  No     Date of Patient follow up with Physician:      Progress Report: []  Yes  [x]  No     Date Range for reporting period:  Beginning: 10/8/2021  Ending:     Progress report due (10 Rx/or 30 days whichever is less):      Recertification due (POC duration/ or 90 days whichever is less):      Visit # Insurance Allowable Auth required? Date Range   3/5 [place]  []  Yes [x]  No      Latex Allergy:  [x]NO      []YES  Preferred Language for Healthcare:   [x]English       []other:    Functional Outcomes Measure:   Date Assessed:  Test:DHI  Score:22    Pain level:  0/10   Dizziness level 1-9/10 varies    History of Injury:Patient stated complaint: Pt notes dizziness began about 4-5 months ago, when she was getting into bed and experienced vertigo with nausea.  She plays softball, but does note vertigo and visual disturbances with looking up to catch ball.        SUBJECTIVE:    10/19: felt bad for about 1 day after eval; still noticing intermittent dizziness ( although somewhat less) with certain positions like looking up  10/21: still having the \"hang over\" feeling and still getting the dizziness with rolling into bed ( to left) but the overall intensity and frequency of the dizziness is much less (80% better) and she is no longer getting the nausea      OBJECTIVE:   Positional Testing: updated 10/21  R Hallpike-Mccurtain maneuver:  NT              Nystagmus:     []? Yes             [x]? No               []? Duration:                                          []? Direction:                  Vertigo:            []? Yes             [x]? No               []? Duration:   L Hallpike-Feliciano maneuver:              Nystagmus:     [x]? Yes             []? No               [x]? Duration: 15 sec                                                 [x]? Direction:    upbeating to the left               Vertigo:            [x]? Yes             []? No               [x]? Duration:  15 sec   Supine roll head right:NT              Nystagmus:     []? Yes             [x]? No               []? Duration:                                          []? Direction:                  Vertigo:            []? Yes             [x]? No               []? Duration:   Supine roll head left:NT              Nystagmus:     []? Yes             [x]? No               []? Duration:                                          []? Direction:                  Vertigo:            []? Yes             [x]?  No               []? Duration:        RESTRICTIONS/PRECAUTIONS:     Exercises/Interventions:     Therapeutic Exercises (78002) Min: Resistance/Reps Notes/Cues                            Therapeutic Activities (00110) Min:15     Self Epley    *Practiced Self Epley for L   Pt instructed to do Epley daily                        Neuromuscular Re-ed (66505) Min:20     Canalith Repositioning Procedure  Feliciano Hallpike + for L posterior canalithiasis        Treated x 2 w/ Liberatory/Semont maneuver   (head 45 to R w/ lie down on L side first x 2 min f/b quick transition to R SL with head maintained in 45 R turn x 2 min )     Epley x 1 as instructed in self Epley as indicated above      *  Carol Romero positional test: retested after 2 Liberatory maneuvers:   negative for nystagmus and dizziness                 Manual Intervention ([id number]) Min:                Modalities  Min:                      Other Therapeutic Activities: Pt was educated on PT POC, Diagnosis, Prognosis, pathomechanics as well as frequency and duration of scheduling future physical therapy appointments. Time was also taken on this day to answer all patient questions and participation in PT. Reviewed appointment policy in detail with patient and patient verbalized understanding. X 10 min     Home Exercise Program: Patient was instructed in the following for HEP:     . Patient verbalized/demonstrated understanding and was issued written handout. 10/19: Lonna Frankel, self Epley   10/21: self Epley for L 1 x day    Therapeutic Exercise and NMR EXR  [x] (79501) Provided verbal/tactile cueing for activities related to strengthening, flexibility, endurance, ROM for improvements in LE, proximal hip, and core control with self care, mobility, lifting, ambulation.  [] (93374) Provided verbal/tactile cueing for activities related to improving balance, coordination, kinesthetic sense, posture, motor skill, proprioception  to assist with LE, proximal hip, and core control in self care, mobility, lifting, ambulation and eccentric single leg control.  [address] and Therapeutic Activities:    [x] (54170 or 75911) Provided verbal/tactile cueing for activities related to improving balance, coordination, kinesthetic sense, posture, motor skill, proprioception and motor activation to allow for proper function of core, proximal hip and LE with self care and ADLs and functional mobility.   [] (83977) Gait Re-education- Provided training and instruction to the patient for proper LE, core and proximal hip recruitment and positioning and eccentric body weight control with ambulation re-education including up and down stairs     Home Exercise Program:    [x] (06436) Reviewed/Progressed HEP activities related to strengthening, flexibility, endurance, ROM of core, proximal hip and LE for functional self-care, mobility, lifting and ambulation/stair navigation   [] (75530)Reviewed/Progressed HEP activities related to improving balance, coordination, kinesthetic sense, posture, motor skill, proprioception of core, proximal hip and LE for self care, mobility, lifting, and ambulation/stair navigation      Manual Treatments:  PROM / STM / Oscillations-Mobs:  G-I, II, III, IV (PA's, Inf., Post.)  [] (11592) Provided manual therapy to mobilize LE, proximal hip and/or LS spine soft tissue/joints for the purpose of modulating pain, promoting relaxation,  increasing ROM, reducing/eliminating soft tissue swelling/inflammation/restriction, improving soft tissue extensibility and allowing for proper ROM for normal function with self care, mobility, lifting and ambulation. Charges:  Timed Code Treatment Minutes: 35   Total Treatment Minutes: 35      [] EVAL (LOW) 83390 (typically 20 minutes face-to-face)  [] EVAL (MOD) 88840 (typically 30 minutes face-to-face)  [] EVAL (HIGH) 88299 (typically 45 minutes face-to-face)  [] RE-EVAL     [] SB(38200) x     [] Dry needle 1 or 2 Muscles (46197)  [x] NMR (97027) x    [] Dry needle 3+ Muscles (07801)  [] Manual (49243) x     [] Ultrasound (64788) x  [x] TA (33862) x     [] Mech Traction (20458)  [] ES(attended) (69058)     [] ES (un) (28892):   [] Vasopump (65838) [] Ionto (77569)   [] Other:    GOALS:  Patient stated goal: \"no dizziness\"  [x]? Progressing: []? Met: []? Not Met: []? Adjusted     Therapist goals for Patient:   Short Term Goals: To be achieved in: 2 weeks  1. Independent in HEP and progression per patient tolerance, in order to prevent re-injury. [x]? Progressing: []? Met: []? Not Met: []? Adjusted  2. Patient will have a decrease in dizziness/imbalance/symptoms by 30-40% to facilitate improvement in movement, function, balance and ADLs as indicated by Functional Deficits. [x]? Progressing: []? Met: []? Not Met: []? Adjusted     Long Term Goals: To be achieved in at discharge  1. Disability index score of 11 or less for the [address] to assist with reaching prior level of function. [x]? Progressing: []? Met: []? Not Met: []? Adjusted   2. Patient will have a decrease in dizziness/imbalance/symptoms by 70-80% to facilitate improvement in movement, function, balance and ADLs as indicated by Functional Deficits. [x]? Progressing: []? Met: []? Not Met: []? Adjusted  3. Patient will demonstrate negative oculomotor special testing/positional testing as indicated by patients Functional Deficits. [x]? Progressing: []? Met: []? Not Met: []? Adjusted  4. Patient will return to functional activities including bed mobility without increased symptoms or restriction. [x]? Progressing: []? Met: []? Not Met: []? Adjusted  5. \"Bed mobility and softball without dizziness\"   [x]? Progressing: []? Met: []? Not Met: []? Adjusted         ASSESSMENT:  See eval    Treatment/Activity Tolerance:  [x] Patient tolerated treatment well [] Patient limited by fatique  [] Patient limited by pain  [] Patient limited by other medical complications  [] Other:     Overall Progression Towards Functional goals/ Treatment Progress Update:  [] Patient is progressing as expected towards functional goals listed. [] Progression is slowed due to complexities/Impairments listed. [] Progression has been slowed due to co-morbidities.   [x] Plan just implemented, too soon to assess goals progression <30days   [] Goals require adjustment due to lack of progress  [] Patient is not progressing as expected and requires additional follow up with physician  [] Other    Prognosis for POC: [x] Good [] Fair  [] Poor    Patient requires continued skilled intervention: [x] Yes  [] No        PLAN: Recheck [place] Hallpike/positional testing; pt to cont with self Epley for L side x 1 daily   [x] Continue per plan of care [] Alter current plan (see comments)  [] Plan of care initiated [] Hold pending MD visit [] Discharge    Electronically signed by: Deb Cameron, PT MPT [id number]    Note: If patient does not return for scheduled/recommended follow up visits, this note will serve as a discharge from care along with the most recent update on progress.

## 2021-10-26 ENCOUNTER — HOSPITAL ENCOUNTER (OUTPATIENT)
Dept: PHYSICAL THERAPY | Age: 71
Setting detail: THERAPIES SERIES
Discharge: HOME | End: 2021-10-26
Payer: MEDICARE

## 2021-10-26 PROCEDURE — 97112 NEUROMUSCULAR REEDUCATION: CPT

## 2021-10-26 NOTE — FLOWSHEET NOTE
much less (80% better) and she is no longer getting the nausea    10/26: doing Epley daily, positional changes bring her just to the edge of feeling dizzy, but it never actually happens       OBJECTIVE:   Positional Testing: updated 10/26  R Hallpike-[place] maneuver:  NT              Nystagmus:     []? Yes             [x]? No               []? Duration:                                          []? Direction:                  Vertigo:            []? Yes             [x]? No               []? Duration:   L Hallpike-[place]ician[place] maneuver:              Nystagmus:     []? Yes             [x]? No               []? Duration:                                                 []? Direction:                 Vertigo:            [x]? Yes             []? No               [x]? Duration:  1 sec    Supine roll head right:NT              Nystagmus:     []? Yes             [x]? No               []? Duration:                                          []? Direction:                  Vertigo:            []? Yes             [x]? No               []? Duration:   Supine roll head left:NT              Nystagmus:     []? Yes             [x]? No               []? Duration:                                          []? Direction:                  Vertigo:            []? Yes             [x]?  No               []? Duration:        RESTRICTIONS/PRECAUTIONS:     Exercises/Interventions:     Therapeutic Exercises (33263) Min: Resistance/Reps Notes/Cues                            Therapeutic Activities (68230) Min:15     Self Epley                           Neuromuscular Re-ed (93425) Min:45     Canalith Repositioning Procedure  Feliciano Hallpike + for L posterior canalithiasis for brief period of dizziness only      Treated x 1 w/ Epley maneuver    Treated x 2 w/ Liberatory/Semont maneuver   (head 45 to R w/ lie down on L side first x 2 min f/b quick transition to R SL with head maintained in 45 R turn x 2 min )     *  [place] Hallpike positional test: retested after 2 Liberatory maneuvers:   Negative for nystagmus, positive for very brief x 1 sec of dizziness;   after sitting up pt did experience nausea, sweating, and HA after rechecking and required seated break x several minutes; pt states \"I feel like I have a hangover. \" Pt was monitored this entire time. Week 1 vestibular protocol  X 1 viewing   S/s x 1 min   Up/dwn x 1 min  Issued week 1 protocol for hep    Habituation ex:  * quick head turns  * quick head nods    Add if babatunde          Parth Hua Unable to try today due to severe s/s of HA, nausea, and dizziness after positional treatment    * pt edu to try this at home when feeling better     Manual Intervention (65461) Min:                Modalities  Min:                      Other Therapeutic Activities: Pt was educated on PT POC, Diagnosis, Prognosis, pathomechanics as well as frequency and duration of scheduling future physical therapy appointments. Time was also taken on this day to answer all patient questions and participation in PT. Reviewed appointment policy in detail with patient and patient verbalized understanding. X 10 min     * 10/26 -  Pt also with c/o R shoulder pain today and questioning things she could do to help it. Pt encouraged to rest and ice shoulder, try OTC anti-inflammatories if ok'd by MD/pharmacist, and issued a piece of orange TB for IR/ER x 10 each. Pt instructed to contact MD if the pain continues and also told to stop any exercises if painful. Home Exercise Program: Patient was instructed in the following for HEP:     . Patient verbalized/demonstrated understanding and was issued written handout.   10/19: Parth Hua, self Epley   10/21: self Epley for L 1 x day  10/26: week 1 vestibular protocol; to begin Parth Hua at home     Therapeutic Exercise and NMR EXR  [x] (75169) Provided verbal/tactile cueing for activities related to strengthening, flexibility, endurance, ROM for improvements in LE, proximal hip, and core control with self care, mobility, lifting, ambulation.  [] (31608) Provided verbal/tactile cueing for activities related to improving balance, coordination, kinesthetic sense, posture, motor skill, proprioception  to assist with LE, proximal hip, and core control in self care, mobility, lifting, ambulation and eccentric single leg control. [address] and Therapeutic Activities:    [x] (60412 or 27140) Provided verbal/tactile cueing for activities related to improving balance, coordination, kinesthetic sense, posture, motor skill, proprioception and motor activation to allow for proper function of core, proximal hip and LE with self care and ADLs and functional mobility.   [] (21977) Gait Re-education- Provided training and instruction to the patient for proper LE, core and proximal hip recruitment and positioning and eccentric body weight control with ambulation re-education including up and down stairs     Home Exercise Program:    [x] (06184) Reviewed/Progressed HEP activities related to strengthening, flexibility, endurance, ROM of core, proximal hip and LE for functional self-care, mobility, lifting and ambulation/stair navigation   [] (56778)Reviewed/Progressed HEP activities related to improving balance, coordination, kinesthetic sense, posture, motor skill, proprioception of core, proximal hip and LE for self care, mobility, lifting, and ambulation/stair navigation      Manual Treatments:  PROM / STM / Oscillations-Mobs:  G-I, II, III, IV (PA's, Inf., Post.)  [] (47979) Provided manual therapy to mobilize LE, proximal hip and/or LS spine soft tissue/joints for the purpose of modulating pain, promoting relaxation,  increasing ROM, reducing/eliminating soft tissue swelling/inflammation/restriction, improving soft tissue extensibility and allowing for proper ROM for normal function with self care, mobility, lifting and ambulation.        Charges:  Timed Code Treatment Minutes: 45   Total Treatment Minutes: 45      [] EVAL (LOW) 97719 (typically 20 minutes face-to-face)  [] EVAL (MOD) 99438 (typically 30 minutes face-to-face)  [] EVAL (HIGH) 22079 (typically 45 minutes face-to-face)  [] RE-EVAL     [] LB(34717) x     [] Dry needle 1 or 2 Muscles (75735)  [x] NMR (91693) x  3 [] Dry needle 3+ Muscles (05026)  [] Manual (01174) x     [] Ultrasound (50570) x  [] TA (15101) x     [] Mech Traction (43092)  [] ES(attended) (45483)     [] ES (un) (27710):   [] Vasopump (99276) [] Ionto (13708)   [] Other:    GOALS:  Patient stated goal: \"no dizziness\"  [x]? Progressing: []? Met: []? Not Met: []? Adjusted     Therapist goals for Patient:   Short Term Goals: To be achieved in: 2 weeks  1. Independent in HEP and progression per patient tolerance, in order to prevent re-injury. [x]? Progressing: []? Met: []? Not Met: []? Adjusted  2. Patient will have a decrease in dizziness/imbalance/symptoms by 30-40% to facilitate improvement in movement, function, balance and ADLs as indicated by Functional Deficits. [x]? Progressing: []? Met: []? Not Met: []? Adjusted     Long Term Goals: To be achieved in at discharge  1. Disability index score of 11 or less for the [address] [place] [address] to assist with reaching prior level of function. [x]? Progressing: []? Met: []? Not Met: []? Adjusted   2. Patient will have a decrease in dizziness/imbalance/symptoms by 70-80% to facilitate improvement in movement, function, balance and ADLs as indicated by Functional Deficits. [x]? Progressing: []? Met: []? Not Met: []? Adjusted  3. Patient will demonstrate negative oculomotor special testing/positional testing as indicated by patients Functional Deficits. [x]? Progressing: []? Met: []? Not Met: []? Adjusted  4. Patient will return to functional activities including bed mobility without increased symptoms or restriction. [x]? Progressing: []? Met: []? Not Met: []? Adjusted  5. \"Bed mobility and softball without dizziness\"   [x]? Progressing: []? Met: []?  Not Met: []? Adjusted         ASSESSMENT:  Positional testing negative today, but pt cont to c/o \"hangover\" symptoms     Treatment/Activity Tolerance:  [x] Patient tolerated treatment well [] Patient limited by fatique  [] Patient limited by pain  [] Patient limited by other medical complications  [] Other:     Overall Progression Towards Functional goals/ Treatment Progress Update:  [] Patient is progressing as expected towards functional goals listed. [x] Progression is slowed due to complexities/Impairments listed. [] Progression has been slowed due to co-morbidities. [] Plan just implemented, too soon to assess goals progression <30days   [] Goals require adjustment due to lack of progress  [] Patient is not progressing as expected and requires additional follow up with physician  [] Other    Prognosis for POC: [x] Good [] Fair  [] Poor    Patient requires continued skilled intervention: [x] Yes  [] No        PLAN: Recheck Feliciano Hallpike/positional testing; pt to cont with self Epley for L side x 1 daily and add habituation   [x] Continue per plan of care [] Alter current plan (see comments)  [] Plan of care initiated [] Hold pending MD visit [] Discharge    Electronically signed by: Ada Barrera, PT MPT [id number]    Note: If patient does not return for scheduled/recommended follow up visits, this note will serve as a discharge from care along with the most recent update on progress.

## 2021-10-28 ENCOUNTER — HOSPITAL ENCOUNTER (OUTPATIENT)
Dept: PHYSICAL THERAPY | Age: 71
Setting detail: THERAPIES SERIES
Discharge: HOME | End: 2021-10-28
Payer: MEDICARE

## 2021-10-28 PROCEDURE — 97112 NEUROMUSCULAR REEDUCATION: CPT

## 2021-10-28 NOTE — FLOWSHEET NOTE
[address] [facility], [facility]  [address] [facility]  Phone: [phone]   Fax:     [phone]      Physical Therapy Treatment Note/ Progress Report:   Date:  10/28/2021    Patient Name:  Lyly Winn    :  1950  MRN: [id number]    Pertinent Medical History: Additional Pertinent Hx: breast cancer 8 yrs ago    Medical/Treatment Diagnosis Information:  · Diagnosis: R42 vertigo  · Treatment Diagnosis: dizziness limiting balance, gait and ADLs    Insurance/Certification information:  PT Insurance Information: [employer] Medicare  Physician Information:  Referring Practitioner: Dr. Manny Mendez of care signed (Y/N): []  Yes [x]  No     Date of Patient follow up with Physician:      Progress Report: []  Yes  [x]  No     Date Range for reporting period:  Beginning: 10/8/2021  Ending:     Progress report due (10 Rx/or 30 days whichever is less):      Recertification due (POC duration/ or 90 days whichever is less):      Visit # Insurance Allowable Auth required? Date Range    [place]  []  Yes [x]  No      Latex Allergy:  [x]NO      []YES  Preferred Language for Healthcare:   [x]English       []other:    Functional Outcomes Measure:   Date Assessed:  Test:DHI  Score:22    Pain level:  0/10   Dizziness level 5-6/10 \"hangover\" s/s plus dizziness getting into bed yesterday     History of Injury:Patient stated complaint: Pt notes dizziness began about 4-5 months ago, when she was getting into bed and experienced vertigo with nausea.  She plays softball, but does note vertigo and visual disturbances with looking up to catch ball.        SUBJECTIVE:    10/19: felt bad for about 1 day after eval; still noticing intermittent dizziness ( although somewhat less) with certain positions like looking up  10/21: still having the \"hang over\" feeling and still getting the dizziness with rolling into bed ( to left) but the overall intensity and frequency of the dizziness is much less (80% better) and she is no longer getting the nausea    10/26: doing Epley daily, positional changes bring her just to the edge of feeling dizzy, but it never actually happens   10/28: not feeling great, so didn't do hep; feeling \"hungover\" w/ HA, foggy, nausea and did have a dizzy spell getting into bed        OBJECTIVE:   Positional Testing: updated 10/28  R Hallpike-[place] maneuver:  NT              Nystagmus:     []? Yes             [x]? No               []? Duration:                                          []? Direction:                  Vertigo:            []? Yes             [x]? No               []? Duration:   L Hallpike-[place] maneuver: see below              Nystagmus:     [x]? Yes             []? No               []? Duration:15 sec                                                 []? Direction:                 Vertigo:            [x]? Yes             []? No               [x]? Duration:  15 sec    Supine roll head right:see below              Nystagmus:     [x]? Yes             []? No               [x]? Duration:  15-30 sec mild geotropic horizontal nystagmus                                        []? Direction:                  Vertigo:            []? Yes             [x]? No               []? Duration:   Supine roll head left:see below              Nystagmus:     [x]? Yes             [x]? No               [x]? Duration:  15-30 sec intense geotropic horizontal nystagmus                                      []? Direction:                  Vertigo:            [x]? Yes             []? No               [x]?  Duration: 15-30 sec        RESTRICTIONS/PRECAUTIONS:     Exercises/Interventions:     Therapeutic Exercises (50243) Min: Resistance/Reps Notes/Cues                            Therapeutic Activities (42406) Min:     Self Epley                           Neuromuscular Re-ed (00195) Min:60     Canalith Repositioning Procedure  Emili MCCULLOUGH posterior canalithiasis initially     Treated x 1 w/ Epley maneuver    Treated x 1 with BBQ for L side but positional test was still +    Treated with Liberatory/Gufani maneuver with SL to R w/ neutral neck x 1-2 min and rapid R head rot to R x 1-2 min            *  Feliciano Hallpike positional test: retested after Epley:   - fast beat horizontal nystagmus beating geotropically to the left with slow beat geotropic nystagmus to the R indicating + L horizontal canal canalithiasis    * Feliciano Hallpike positional test retested after BBQ maneuver:  - positive for L horizontal canal canalithiasis with same results    * Francis Hunt after Liberatory/Gufani - pt unable to tolerate further testing or treatment  * pt vomited and had to sit in dark room with ice water and cool washcloth x 1 hour with intermittent monitoring by PT before she was able to leave; PT escorted pt outside where she stated she was ok to leave; PT encouraged pt to eat some lunch (she only had animal crackers and coffee so far all day due to fear of vomiting) and to try taking OTC dramamine for cont nausea after today's appt      Week 1 vestibular protocol  Issued week 1 protocol for hep    Habituation ex:  * quick head turns  * quick head nods          Jake Dickson     Manual Intervention (77311) Min:                Modalities  Min:                      Other Therapeutic Activities: Pt was educated on PT POC, Diagnosis, Prognosis, pathomechanics as well as frequency and duration of scheduling future physical therapy appointments. Time was also taken on this day to answer all patient questions and participation in PT. Reviewed appointment policy in detail with patient and patient verbalized understanding. X 10 min     * 10/26 -  Pt also with c/o R shoulder pain today and questioning things she could do to help it. Pt encouraged to rest and ice shoulder, try OTC anti-inflammatories if ok'd by MD/pharmacist, and issued a piece of orange TB for IR/ER x 10 each. Pt instructed to contact MD if the pain continues and also told to stop any exercises if painful. Home Exercise Program: Patient was instructed in the following for HEP:     . Patient verbalized/demonstrated understanding and was issued written handout. 10/19: Yesenia Garcias, self Epley   10/21: self Epley for L 1 x day  10/26: week 1 vestibular protocol; to begin Yesenia Garcias at home     Therapeutic Exercise and NMR EXR  [x] (74702) Provided verbal/tactile cueing for activities related to strengthening, flexibility, endurance, ROM for improvements in LE, proximal hip, and core control with self care, mobility, lifting, ambulation.  [] (11471) Provided verbal/tactile cueing for activities related to improving balance, coordination, kinesthetic sense, posture, motor skill, proprioception  to assist with LE, proximal hip, and core control in self care, mobility, lifting, ambulation and eccentric single leg control.  [address] and Therapeutic Activities:    [x] (77230 or 54131) Provided verbal/tactile cueing for activities related to improving balance, coordination, kinesthetic sense, posture, motor skill, proprioception and motor activation to allow for proper function of core, proximal hip and LE with self care and ADLs and functional mobility.   [] (05764) Gait Re-education- Provided training and instruction to the patient for proper LE, core and proximal hip recruitment and positioning and eccentric body weight control with ambulation re-education including up and down stairs     Home Exercise Program:    [x] (08168) Reviewed/Progressed HEP activities related to strengthening, flexibility, endurance, ROM of core, proximal hip and LE for functional self-care, mobility, lifting and ambulation/stair navigation   [] (76663)Reviewed/Progressed HEP activities related to improving balance, coordination, kinesthetic sense, posture, motor skill, proprioception of core, proximal hip and LE for self care, mobility, lifting, and ambulation/stair navigation      Manual Treatments:  PROM / STM / Oscillations-Mobs:  G-I, II, III, IV (PA's, Inf., Post.)  [] (62102) Provided manual therapy to mobilize LE, proximal hip and/or LS spine soft tissue/joints for the purpose of modulating pain, promoting relaxation,  increasing ROM, reducing/eliminating soft tissue swelling/inflammation/restriction, improving soft tissue extensibility and allowing for proper ROM for normal function with self care, mobility, lifting and ambulation. Charges:  Timed Code Treatment Minutes: 60   Total Treatment Minutes: 120      [] EVAL (LOW) 44437 (typically 20 minutes face-to-face)  [] EVAL (MOD) 30537 (typically 30 minutes face-to-face)  [] EVAL (HIGH) 61011 (typically 45 minutes face-to-face)  [] RE-EVAL     [] FY(96686) x     [] Dry needle 1 or 2 Muscles (48123)  [x] NMR (43149) x  4 [] Dry needle 3+ Muscles (79868)  [] Manual (20367) x     [] Ultrasound (39529) x  [] TA (73074) x     [] Mech Traction (11897)  [] ES(attended) (93579)     [] ES (un) (34552):   [] Vasopump (05992) [] Ionto (03610)   [] Other:    GOALS:  Patient stated goal: \"no dizziness\"  [x]? Progressing: []? Met: []? Not Met: []? Adjusted     Therapist goals for Patient:   Short Term Goals: To be achieved in: 2 weeks  1. Independent in HEP and progression per patient tolerance, in order to prevent re-injury. [x]? Progressing: []? Met: []? Not Met: []? Adjusted  2. Patient will have a decrease in dizziness/imbalance/symptoms by 30-40% to facilitate improvement in movement, function, balance and ADLs as indicated by Functional Deficits. [x]? Progressing: []? Met: []? Not Met: []? Adjusted     Long Term Goals: To be achieved in at discharge  1. Disability index score of 11 or less for the [address] [place] [address] to assist with reaching prior level of function. [x]? Progressing: []? Met: []? Not Met: []? Adjusted   2.  Patient will have a decrease in dizziness/imbalance/symptoms by 70-80% to facilitate improvement in movement, function, balance and ADLs as indicated by Functional Deficits. [x]? Progressing: []? Met: []? Not Met: []? Adjusted  3. Patient will demonstrate negative oculomotor special testing/positional testing as indicated by patients Functional Deficits. [x]? Progressing: []? Met: []? Not Met: []? Adjusted  4. Patient will return to functional activities including bed mobility without increased symptoms or restriction. [x]? Progressing: []? Met: []? Not Met: []? Adjusted  5. \"Bed mobility and softball without dizziness\"   [x]? Progressing: []? Met: []? Not Met: []? Adjusted         ASSESSMENT:  Positional testing negative today, but pt cont to c/o \"hangover\" symptoms     Treatment/Activity Tolerance:  [x] Patient tolerated treatment well [] Patient limited by fatique  [] Patient limited by pain  [] Patient limited by other medical complications  [] Other:     Overall Progression Towards Functional goals/ Treatment Progress Update:  [] Patient is progressing as expected towards functional goals listed. [x] Progression is slowed due to complexities/Impairments listed. [] Progression has been slowed due to co-morbidities. [] Plan just implemented, too soon to assess goals progression <30days   [] Goals require adjustment due to lack of progress  [] Patient is not progressing as expected and requires additional follow up with physician  [] Other    Prognosis for POC: [x] Good [] Fair  [] Poor    Patient requires continued skilled intervention: [x] Yes  [] No        PLAN: Recheck Feliciano Hallpike/positional testing  [x] Continue per plan of care [] Alter current plan (see comments)  [] Plan of care initiated [] Hold pending MD visit [] Discharge    Electronically signed by: Jennifer Dave, PT MPT [id number]    Note: If patient does not return for scheduled/recommended follow up visits, this note will serve as a discharge from care along with the most recent update on progress.

## 2021-11-09 ENCOUNTER — HOSPITAL ENCOUNTER (OUTPATIENT)
Dept: PHYSICAL THERAPY | Age: 71
Setting detail: THERAPIES SERIES
Discharge: HOME | End: 2021-11-09
Payer: MEDICARE

## 2021-11-09 PROCEDURE — 97112 NEUROMUSCULAR REEDUCATION: CPT

## 2021-11-09 NOTE — FLOWSHEET NOTE
[place] [facility], [facility]  [address] [facility] [place], [facility]  Phone: [phone]   Fax:     [phone]      Physical Therapy Treatment Note/ Progress Report:   Date:  2021    Patient Name:  Florence Nixon    :  1950  MRN: [id number]    Pertinent Medical History: Additional Pertinent Hx: breast cancer 8 yrs ago    Medical/Treatment Diagnosis Information:  · Diagnosis: R42 vertigo  · Treatment Diagnosis: dizziness limiting balance, gait and ADLs    Insurance/Certification information:  PT Insurance Information: Aetna Medicare  Physician Information:  Referring Practitioner: Dr. Rosetta House of care signed (Y/N): []  Yes [x]  No     Date of Patient follow up with Physician:      Progress Report: []  Yes  [x]  No     Date Range for reporting period:  Beginning: 10/8/2021  Ending:     Progress report due (10 Rx/or 30 days whichever is less):      Recertification due (POC duration/ or 90 days whichever is less):      Visit # Insurance Allowable Auth required? Date Range    [place]  []  Yes [x]  No      Latex Allergy:  [x]NO      []YES  Preferred Language for Healthcare:   [x]English       []other:    Functional Outcomes Measure:   Date Assessed:  Test:DHI  Score:22    Pain level:  0/10       History of Injury:Patient stated complaint: Pt notes dizziness began about 4-5 months ago, when she was getting into bed and experienced vertigo with nausea.  She plays softball, but does note vertigo and visual disturbances with looking up to catch ball.        SUBJECTIVE:    10/19: felt bad for about 1 day after eval; still noticing intermittent dizziness ( although somewhat less) with certain positions like looking up  10/21: still having the \"hang over\" feeling and still getting the dizziness with rolling into bed ( to left) but the overall intensity and frequency of the dizziness is much less (80% better) and she is no longer getting the nausea    10/26: doing Epley daily, positional changes bring her just to the edge of feeling dizzy, but it never actually happens   10/28: not feeling great, so didn't do hep; feeling \"hungover\" w/ HA, foggy, nausea and did have a dizzy spell getting into bed   11/9: took 3 days to get over last session; did go on her trip and feels dizziness is still there mildly but improved and less frequent        OBJECTIVE:   Positional Testing: updated 11/9/21  R Hallpike-Feliciano maneuver:                Nystagmus:     []? Yes             [x]? No               []? Duration:                                          []? Direction:                  Vertigo:            []? Yes             [x]? No               []? Duration:   L Hallpike-Dalton maneuver:               Nystagmus:     []? Yes             [x]? No               []? Duration:                                                 []? Direction:                 Vertigo:            []? Yes             [x]? No               []? Duration:      Supine roll head right:              Nystagmus:     []? Yes             [x]? No               []? Duration:                                          []? Direction:                  Vertigo:            []? Yes             [x]? No               []? Duration:   Supine roll head left:              Nystagmus:     []? Yes             [x]? No               []? Duration:                                        []? Direction:                  Vertigo:            []? Yes             [x]?  No               []? Duration:        RESTRICTIONS/PRECAUTIONS:     Exercises/Interventions:     Therapeutic Exercises (28602) Min: Resistance/Reps Notes/Cues                            Therapeutic Activities (00351) Min:     Self Epley                           Neuromuscular Re-ed (61244) Min:38     Canalith Repositioning Procedure  Testing of all canals: all negative today                     Week 1 vestibular protocol     X 1 viewing X 1 viewing with focus on hook  S/s x 30 sec  Up/dwn x 30 sec  Issued week 1 protocol for hep    Habituation ex:  * quick head turns  * quick head nods   * fwd bend 2 x 10   2 x 10   2 x 5       Mild \"foggy in the head\"   NBOS  *head turns  *head nods  *w/ EC   X 10  X 10   X 30 sec     Airex  *NBOS EC   X 30 sec    Amb:  * w/ head turns  * w/ head nods  * w/ EC     Joseph Daroff   X 2 reps each side       Mild \"foggy in the head\" for a few seconds    *pt's s/s of fogginess in the head and nausea increased as exercises progressed; exercises limited b/c of this    Manual Intervention (78468) Min:                Modalities  Min:                      Other Therapeutic Activities: Pt was educated on PT POC, Diagnosis, Prognosis, pathomechanics as well as frequency and duration of scheduling future physical therapy appointments. Time was also taken on this day to answer all patient questions and participation in PT. Reviewed appointment policy in detail with patient and patient verbalized understanding. * 10/26 -  Pt also with c/o R shoulder pain today and questioning things she could do to help it. Pt encouraged to rest and ice shoulder, try OTC anti-inflammatories if ok'd by MD/pharmacist, and issued a piece of orange TB for IR/ER x 10 each. Pt instructed to contact MD if the pain continues and also told to stop any exercises if painful. Home Exercise Program: Patient was instructed in the following for HEP:     . Patient verbalized/demonstrated understanding and was issued written handout. 10/19: Mary Ellen Signs, self Epley   10/21: self Epley for L 1 x day  10/26: week 1 vestibular protocol; to begin Mary Ellen Signs at home  11/9: habituation ex      Therapeutic Exercise and NMR EXR  [x] (17835) Provided verbal/tactile cueing for activities related to strengthening, flexibility, endurance, ROM for improvements in LE, proximal hip, and core control with self care, mobility, lifting, ambulation.   [] (20878) Provided verbal/tactile cueing for activities related to improving balance, coordination, kinesthetic sense, posture, motor skill, proprioception  to assist with LE, proximal hip, and core control in self care, mobility, lifting, ambulation and eccentric single leg control. [address] and Therapeutic Activities:    [x] (54229 or 88862) Provided verbal/tactile cueing for activities related to improving balance, coordination, kinesthetic sense, posture, motor skill, proprioception and motor activation to allow for proper function of core, proximal hip and LE with self care and ADLs and functional mobility.   [] (23561) Gait Re-education- Provided training and instruction to the patient for proper LE, core and proximal hip recruitment and positioning and eccentric body weight control with ambulation re-education including up and down stairs     Home Exercise Program:    [x] (71454) Reviewed/Progressed HEP activities related to strengthening, flexibility, endurance, ROM of core, proximal hip and LE for functional self-care, mobility, lifting and ambulation/stair navigation   [] (59118)Reviewed/Progressed HEP activities related to improving balance, coordination, kinesthetic sense, posture, motor skill, proprioception of core, proximal hip and LE for self care, mobility, lifting, and ambulation/stair navigation      Manual Treatments:  PROM / STM / Oscillations-Mobs:  G-I, II, III, IV (PA's, Inf., Post.)  [] (94394) Provided manual therapy to mobilize LE, proximal hip and/or LS spine soft tissue/joints for the purpose of modulating pain, promoting relaxation,  increasing ROM, reducing/eliminating soft tissue swelling/inflammation/restriction, improving soft tissue extensibility and allowing for proper ROM for normal function with self care, mobility, lifting and ambulation.        Charges:  Timed Code Treatment Minutes: 38   Total Treatment Minutes: 38      [] EVAL (LOW) 72904 (typically 20 minutes face-to-face)  [] EVAL (MOD) 53007 (typically 30 minutes face-to-face)  [] EVAL (HIGH) 78444 (typically 45 minutes face-to-face)  [] RE-EVAL     [] BF(74830) x     [] Dry needle 1 or 2 Muscles (59855)  [x] NMR (51517) x  3 [] Dry needle 3+ Muscles (97661)  [] Manual (59591) x     [] Ultrasound (87765) x  [] TA (80686) x     [] [facility] (30437)  [] ES(attended) (99129)     [] ES (un) (05738):   [] Vasopump (43198) [] Ionto (42633)   [] Other:    GOALS:  Patient stated goal: \"no dizziness\"  [x]? Progressing: []? Met: []? Not Met: []? Adjusted     Therapist goals for Patient:   Short Term Goals: To be achieved in: 2 weeks  1. Independent in HEP and progression per patient tolerance, in order to prevent re-injury. [x]? Progressing: []? Met: []? Not Met: []? Adjusted  2. Patient will have a decrease in dizziness/imbalance/symptoms by 30-40% to facilitate improvement in movement, function, balance and ADLs as indicated by Functional Deficits. [x]? Progressing: []? Met: []? Not Met: []? Adjusted     Long Term Goals: To be achieved in at discharge  1. Disability index score of 11 or less for the [facility] to assist with reaching prior level of function. [x]? Progressing: []? Met: []? Not Met: []? Adjusted   2. Patient will have a decrease in dizziness/imbalance/symptoms by 70-80% to facilitate improvement in movement, function, balance and ADLs as indicated by Functional Deficits. [x]? Progressing: []? Met: []? Not Met: []? Adjusted  3. Patient will demonstrate negative oculomotor special testing/positional testing as indicated by patients Functional Deficits. [x]? Progressing: []? Met: []? Not Met: []? Adjusted  4. Patient will return to functional activities including bed mobility without increased symptoms or restriction. [x]? Progressing: []? Met: []? Not Met: []? Adjusted  5. \"Bed mobility and softball without dizziness\"   [x]? Progressing: []? Met: []? Not Met: []?  Adjusted         ASSESSMENT:  11/9: improvement in dizziness; \"not as frequent\"; all positional testing negative     Treatment/Activity Tolerance:  [x] Patient tolerated treatment well [] Patient limited by fatique  [] Patient limited by pain  [] Patient limited by other medical complications  [] Other:     Overall Progression Towards Functional goals/ Treatment Progress Update:  [] Patient is progressing as expected towards functional goals listed. [x] Progression is slowed due to complexities/Impairments listed. [] Progression has been slowed due to co-morbidities. [] Plan just implemented, too soon to assess goals progression <30days   [] Goals require adjustment due to lack of progress  [] Patient is not progressing as expected and requires additional follow up with physician  [] Other    Prognosis for POC: [x] Good [] Fair  [] Poor    Patient requires continued skilled intervention: [x] Yes  [] No        PLAN: Recheck Feliciano Hallpike/positional testing; assess habituation ex   [x] Continue per plan of care [] Alter current plan (see comments)  [] Plan of care initiated [] Hold pending MD visit [] Discharge    Electronically signed by: Antonio Doan, PT MPT [id number]    Note: If patient does not return for scheduled/recommended follow up visits, this note will serve as a discharge from care along with the most recent update on progress.

## 2021-11-16 ENCOUNTER — APPOINTMENT (OUTPATIENT)
Dept: PHYSICAL THERAPY | Age: 71
End: 2021-11-16
Payer: MEDICARE

## 2021-12-02 ENCOUNTER — HOSPITAL ENCOUNTER (OUTPATIENT)
Dept: PHYSICAL THERAPY | Age: 71
Setting detail: THERAPIES SERIES
Discharge: HOME | End: 2021-12-02
Payer: MEDICARE

## 2021-12-02 PROCEDURE — 97112 NEUROMUSCULAR REEDUCATION: CPT

## 2021-12-02 NOTE — FLOWSHEET NOTE
[place] [facility], [facility]  [address] [facility], [facility]  Phone: [phone]   Fax:     [phone]      Physical Therapy Treatment Note/ Progress Report:   Date:  2021    Patient Name:  Eric Baird    :  1950  MRN: [id number]    Pertinent Medical History: Additional Pertinent Hx: breast cancer 8 yrs ago    Medical/Treatment Diagnosis Information:  · Diagnosis: R42 vertigo  · Treatment Diagnosis: dizziness limiting balance, gait and ADLs    Insurance/Certification information:  PT Insurance Information: Aetna Medicare  Physician Information:  Referring Practitioner: Dr. Kidd Oh of care signed (Y/N): []  Yes [x]  No     Date of Patient follow up with Physician:      Progress Report: []  Yes  [x]  No     Date Range for reporting period:  Beginning: 10/8/2021  Ending:     Progress report due (10 Rx/or 30 days whichever is less):      Recertification due (POC duration/ or 90 days whichever is less):      Visit # Insurance Allowable Auth required? Date Range    [place]  []  Yes [x]  No      Latex Allergy:  [x]NO      []YES  Preferred Language for Healthcare:   [x]English       []other:    Functional Outcomes Measure:   Date Assessed:  Test:DHI  Score:22    Pain level:  0/10       History of Injury:Patient stated complaint: Pt notes dizziness began about 4-5 months ago, when she was getting into bed and experienced vertigo with nausea.  She plays softball, but does note vertigo and visual disturbances with looking up to catch ball.        SUBJECTIVE:    10/19: felt bad for about 1 day after eval; still noticing intermittent dizziness ( although somewhat less) with certain positions like looking up  10/21: still having the \"hang over\" feeling and still getting the dizziness with rolling into bed ( to left) but the overall intensity and frequency of the dizziness is much less (80% better) and she is no longer getting the nausea    10/26: doing Epley daily, positional changes bring her just to the edge of feeling dizzy, but it never actually happens   10/28: not feeling great, so didn't do hep; feeling \"hungover\" w/ HA, foggy, nausea and did have a dizzy spell getting into bed   11/9: took 3 days to get over last session; did go on her trip and feels dizziness is still there mildly but improved and less frequent   12/2: pt notes cont c/o \"foggy head\" and feelings of dizziness/imbalance with looking up, on ladder, etc.      OBJECTIVE:   Positional Testing: updated 12/2/21  R Hallpike-Feliciano maneuver:                Nystagmus:     []? Yes             [x]? No               []? Duration:                                          []? Direction:                  Vertigo:            []? Yes             [x]? No               []? Duration:   L Hallpike-Feliciano maneuver:               Nystagmus:     []? Yes             [x]? No               []? Duration:                                                 []? Direction:                 Vertigo:            []? Yes             [x]? No               []? Duration:      Supine roll head right:              Nystagmus:     []? Yes             [x]? No               []? Duration:                                          []? Direction:                  Vertigo:            []? Yes             [x]? No               []? Duration:   Supine roll head left:              Nystagmus:     []? Yes             [x]? No               []? Duration:                                        []? Direction:                  Vertigo:            []? Yes             [x]?  No               []? Duration:        RESTRICTIONS/PRECAUTIONS:     Exercises/Interventions:     Therapeutic Exercises (63071) Min: Resistance/Reps Notes/Cues                            Therapeutic Activities (70642) Min:     Self Epley                           Neuromuscular Re-ed (58298) Min:35     Canalith Repositioning Procedure  Testing was issued written handout. 10/19: Jessie Mohs, self Epley   10/21: self Epley for L 1 x day  10/26: week 1 vestibular protocol; to begin Jessie Mohs at home  11/9: habituation ex      Therapeutic Exercise and NMR EXR  [x] (47344) Provided verbal/tactile cueing for activities related to strengthening, flexibility, endurance, ROM for improvements in LE, proximal hip, and core control with self care, mobility, lifting, ambulation.  [] (14053) Provided verbal/tactile cueing for activities related to improving balance, coordination, kinesthetic sense, posture, motor skill, proprioception  to assist with LE, proximal hip, and core control in self care, mobility, lifting, ambulation and eccentric single leg control.  [address] and Therapeutic Activities:    [x] (61669 or 48570) Provided verbal/tactile cueing for activities related to improving balance, coordination, kinesthetic sense, posture, motor skill, proprioception and motor activation to allow for proper function of core, proximal hip and LE with self care and ADLs and functional mobility.   [] (41653) Gait Re-education- Provided training and instruction to the patient for proper LE, core and proximal hip recruitment and positioning and eccentric body weight control with ambulation re-education including up and down stairs     Home Exercise Program:    [x] (26105) Reviewed/Progressed HEP activities related to strengthening, flexibility, endurance, ROM of core, proximal hip and LE for functional self-care, mobility, lifting and ambulation/stair navigation   [] (95032)Reviewed/Progressed HEP activities related to improving balance, coordination, kinesthetic sense, posture, motor skill, proprioception of core, proximal hip and LE for self care, mobility, lifting, and ambulation/stair navigation      Manual Treatments:  PROM / STM / Oscillations-Mobs:  G-I, II, III, IV (PA's, Inf., Post.)  [] (61657) Provided manual therapy to mobilize LE, proximal hip and/or LS spine soft tissue/joints for the purpose of modulating pain, promoting relaxation,  increasing ROM, reducing/eliminating soft tissue swelling/inflammation/restriction, improving soft tissue extensibility and allowing for proper ROM for normal function with self care, mobility, lifting and ambulation. Charges:  Timed Code Treatment Minutes: 35   Total Treatment Minutes: 35      [] EVAL (LOW) 12369 (typically 20 minutes face-to-face)  [] EVAL (MOD) 13057 (typically 30 minutes face-to-face)  [] EVAL (HIGH) 47276 (typically 45 minutes face-to-face)  [] RE-EVAL     [] II(83886) x     [] Dry needle 1 or 2 Muscles (12760)  [x] NMR (23601) x2   [] Dry needle 3+ Muscles (78562)  [] Manual (45404) x     [] Ultrasound (81239) x  [] TA (33922) x     [] Mech Traction (99574)  [] ES(attended) (14529)     [] ES (un) (01445):   [] Vasopump (17634) [] Ionto (98488)   [] Other:    GOALS:  Patient stated goal: \"no dizziness\"  [x]? Progressing: []? Met: []? Not Met: []? Adjusted     Therapist goals for Patient:   Short Term Goals: To be achieved in: 2 weeks  1. Independent in HEP and progression per patient tolerance, in order to prevent re-injury. [x]? Progressing: []? Met: []? Not Met: []? Adjusted  2. Patient will have a decrease in dizziness/imbalance/symptoms by 30-40% to facilitate improvement in movement, function, balance and ADLs as indicated by Functional Deficits. [x]? Progressing: []? Met: []? Not Met: []? Adjusted     Long Term Goals: To be achieved in at discharge  1. Disability index score of 11 or less for the [facility] to assist with reaching prior level of function. [x]? Progressing: []? Met: []? Not Met: []? Adjusted   2. Patient will have a decrease in dizziness/imbalance/symptoms by 70-80% to facilitate improvement in movement, function, balance and ADLs as indicated by Functional Deficits. [x]? Progressing: []? Met: []? Not Met: []? Adjusted  3.  Patient will demonstrate negative oculomotor special testing/positional testing as indicated by patients Functional Deficits. [x]? Progressing: []? Met: []? Not Met: []? Adjusted  4. Patient will return to functional activities including bed mobility without increased symptoms or restriction. [x]? Progressing: []? Met: []? Not Met: []? Adjusted  5. \"Bed mobility and softball without dizziness\"   [x]? Progressing: []? Met: []? Not Met: []? Adjusted         ASSESSMENT:  12/2: all positional testing negative still; cont to have very sensitive reaction to vestibular exercises     Treatment/Activity Tolerance:  [x] Patient tolerated treatment well [] Patient limited by fatique  [] Patient limited by pain  [] Patient limited by other medical complications  [] Other:     Overall Progression Towards Functional goals/ Treatment Progress Update:  [] Patient is progressing as expected towards functional goals listed. [x] Progression is slowed due to complexities/Impairments listed. [] Progression has been slowed due to co-morbidities. [] Plan just implemented, too soon to assess goals progression <30days   [] Goals require adjustment due to lack of progress  [] Patient is not progressing as expected and requires additional follow up with physician  [] Other    Prognosis for POC: [x] Good [] Fair  [] Poor    Patient requires continued skilled intervention: [x] Yes  [] No        PLAN:  assess habituation ex/vestibular protocol; add week 2 in 2 weeks if tolerated   [x] Continue per plan of care [] Alter current plan (see comments)  [] Plan of care initiated [] Hold pending MD visit [] Discharge    Electronically signed by: Glo Smith, PT MPT [id number]    Note: If patient does not return for scheduled/recommended follow up visits, this note will serve as a discharge from care along with the most recent update on progress.

## 2021-12-07 ENCOUNTER — HOSPITAL ENCOUNTER (OUTPATIENT)
Dept: PHYSICAL THERAPY | Age: 71
Setting detail: THERAPIES SERIES
End: 2021-12-07
Payer: MEDICARE

## 2021-12-16 ENCOUNTER — HOSPITAL ENCOUNTER (OUTPATIENT)
Dept: PHYSICAL THERAPY | Age: 71
Setting detail: THERAPIES SERIES
Discharge: HOME | End: 2021-12-16
Payer: MEDICARE

## 2021-12-16 PROCEDURE — 97530 THERAPEUTIC ACTIVITIES: CPT

## 2021-12-16 PROCEDURE — 97112 NEUROMUSCULAR REEDUCATION: CPT

## 2021-12-16 NOTE — FLOWSHEET NOTE
[place] [facility], [facility]  [address] [facility] [facility] [place], [facility]  Phone: [phone]   Fax:     [phone]      Physical Therapy Treatment Note/ Progress Report:   Date:  2021    Patient Name:  Bk Barrett    :  1950  MRN: [id number]    Pertinent Medical History: Additional Pertinent Hx: breast cancer 8 yrs ago    Medical/Treatment Diagnosis Information:  · Diagnosis: R42 vertigo  · Treatment Diagnosis: dizziness limiting balance, gait and ADLs    Insurance/Certification information:  PT Insurance Information: A[employer]na Medicare  Physician Information:  Referring Practitioner: Dr. Christiano Longoria of care signed (Y/N): [x]  Yes []  No     Date of Patient follow up with Physician:      Progress Report: [x]  Yes 21  []  No     Date Range for reporting period:  Beginning: 10/8/2021  Ending:     Progress report due (10 Rx/or 30 days whichever is less):  38    Recertification due (POC duration/ or 90 days whichever is less):      Visit # Insurance Allowable Auth required? Date Range    [place]  []  Yes [x]  No      Latex Allergy:  [x]NO      []YES  Preferred Language for Healthcare:   [x]English       []other:    Functional Outcomes Measure:   Date Assessed:  Test:DHI  Score:22  21 update: DHI 20 (20-39%)    Pain level:  0/10       History of Injury:Patient stated complaint: Pt notes dizziness began about 4-5 months ago, when she was getting into bed and experienced vertigo with nausea.  She plays softball, but does note vertigo and visual disturbances with looking up to catch ball.        SUBJECTIVE:    10/19: felt bad for about 1 day after eval; still noticing intermittent dizziness ( although somewhat less) with certain positions like looking up  10/21: still having the \"hang over\" feeling and still getting the dizziness with rolling into bed ( to left) but the overall intensity and frequency of the dizziness is much less (80% better) and she is no longer getting the nausea    10/26: doing Epley daily, positional changes bring her just to the edge of feeling dizzy, but it never actually happens   10/28: not feeling great, so didn't do hep; feeling \"hungover\" w/ HA, foggy, nausea and did have a dizzy spell getting into bed   11/9: took 3 days to get over last session; did go on her trip and feels dizziness is still there mildly but improved and less frequent   12/2: pt notes cont c/o \"foggy head\" and feelings of dizziness/imbalance with looking up, on ladder, etc.    12/16: did week one protocol ex daily x 3-4 days with cont c/o \"fogginess\" and on day 3 vertigo spells returned with getting into bed and with eye movements; tried the hep again the next day but just felt awful so she has stopped everything; pt is now c/o 3-4 seconds of vertigo and nausea sometimes with getting into bed on L side and rolling to her back; \"fogginess and headache\" that was constant is dec by 80%     OBJECTIVE:   Positional Testing: updated 12/16/21  R Hallpike-Feliciano maneuver:  Modified               Nystagmus:     []? Yes             [x]? No               []? Duration:                                          []? Direction:                  Vertigo:            []? Yes             [x]? No               []? Duration:   L Hallpike-Galva maneuver: modified              Nystagmus:     []? Yes             [x]? No               []? Duration:                                                 []? Direction:                 Vertigo:            []? Yes             [x]? No               []? Duration:   very brief feeling like the dizziness would start, but it didn't start    Supine roll head right:              Nystagmus:     []? Yes             [x]? No               []? Duration:                                          []? Direction:                  Vertigo:            []? Yes             [x]?  No               []? Duration:   Supine roll head left:              Nystagmus:     []? Yes             [x]? No               []? Duration:                                        []? Direction:                  Vertigo:            []? Yes             [x]? No               []? Duration:        RESTRICTIONS/PRECAUTIONS:     Exercises/Interventions:     Therapeutic Exercises (12385) Min: Resistance/Reps Notes/Cues                            Therapeutic Activities (00741) Min:15 See below    Self Epley                           Neuromuscular Re-ed (94365) Min:20     Canalith Repositioning Procedure  Testing of all canals as indicated above: all negative today, pt notes very brief feelings that dizziness will start w/ L modified Dewitte Valeria, but no dizzy c/o and no nystagmus noted                      Week 1 vestibular protocol       X 1 viewing     12/16: per pt reports of 80% improved overall with dizziness and fogginess/HA PT decided to hold on treatment at this time despite mild intermittent period of vertigo with certain movements          Habituation ex:  * quick head turns  * quick head nods   * fwd bend       Mild \"foggy in the head\"   NBOS  *head turns  *head nods  *w/ EC    Airex  *NBOS EC    Amb:  * w/ head turns  * w/ head nods  * w/ EC    Jake Dickson     Manual Intervention (82026) Min:                Modalities  Min:                      Other Therapeutic Activities: Pt was educated on PT POC, Diagnosis, Prognosis, pathomechanics as well as frequency and duration of scheduling future physical therapy appointments. Time was also taken on this day to answer all patient questions and participation in PT. Reviewed appointment policy in detail with patient and patient verbalized understanding. * 10/26 -  Pt also with c/o R shoulder pain today and questioning things she could do to help it. Pt encouraged to rest and ice shoulder, try OTC anti-inflammatories if ok'd by MD/pharmacist, and issued a piece of orange TB for IR/ER x 10 each.   Pt instructed to contact MD if the pain continues and also told to stop any exercises if painful. Home Exercise Program: Patient was instructed in the following for HEP:     . Patient verbalized/demonstrated understanding and was issued written handout. 10/19: Peder Nilaa, self Epley   10/21: self Epley for L 1 x day  10/26: week 1 vestibular protocol; to begin Pedjean Dotsona at home  11/9: habituation ex      Therapeutic Exercise and NMR EXR  [x] (57851) Provided verbal/tactile cueing for activities related to strengthening, flexibility, endurance, ROM for improvements in LE, proximal hip, and core control with self care, mobility, lifting, ambulation.  [] (06845) Provided verbal/tactile cueing for activities related to improving balance, coordination, kinesthetic sense, posture, motor skill, proprioception  to assist with LE, proximal hip, and core control in self care, mobility, lifting, ambulation and eccentric single leg control.  [address] and Therapeutic Activities:    [x] (78723 or 57473) Provided verbal/tactile cueing for activities related to improving balance, coordination, kinesthetic sense, posture, motor skill, proprioception and motor activation to allow for proper function of core, proximal hip and LE with self care and ADLs and functional mobility.   [] (38762) Gait Re-education- Provided training and instruction to the patient for proper LE, core and proximal hip recruitment and positioning and eccentric body weight control with ambulation re-education including up and down stairs     Home Exercise Program:    [x] (57833) Reviewed/Progressed HEP activities related to strengthening, flexibility, endurance, ROM of core, proximal hip and LE for functional self-care, mobility, lifting and ambulation/stair navigation   [] (55155)Reviewed/Progressed HEP activities related to improving balance, coordination, kinesthetic sense, posture, motor skill, proprioception of core, proximal hip and LE for self care, mobility, lifting, and ambulation/stair navigation      Manual Treatments:  PROM / STM / Oscillations-Mobs:  G-I, II, III, IV (PA's, Inf., Post.)  [] (28425) Provided manual therapy to mobilize LE, proximal hip and/or LS spine soft tissue/joints for the purpose of modulating pain, promoting relaxation,  increasing ROM, reducing/eliminating soft tissue swelling/inflammation/restriction, improving soft tissue extensibility and allowing for proper ROM for normal function with self care, mobility, lifting and ambulation. Charges:  Timed Code Treatment Minutes: 35   Total Treatment Minutes: 35      [] EVAL (LOW) 23303 (typically 20 minutes face-to-face)  [] EVAL (MOD) 45225 (typically 30 minutes face-to-face)  [] EVAL (HIGH) 55261 (typically 45 minutes face-to-face)  [] RE-EVAL     [] JS(88233) x     [] Dry needle 1 or 2 Muscles (68650)  [x] NMR (61448) x  [] Dry needle 3+ Muscles (23051)  [] Manual (04373) x     [] Ultrasound (83864) x  [x] TA (77577) x     [] Mech Traction (29688)  [] ES(attended) (89131)     [] ES (un) (16239):   [] Vasopump (16288) [] Ionto (38209)   [] Other:    GOALS:  12/16 update  * 75-80% improved overall with dizziness, fogginess, HA and \"hangover\" s/s  * pt has stopped all hep due to inc of s/s with them  * brief periods of intermittent vertigo with certain movements (getting into bed) has returned, but they don't happen all of the time  * all positional testing in PT negative  * due to pt's inc sensitivity to vestibular stimuli and reporting 75-80% improvement overall, PT recommended to hold PT and hep at this time and for pt to cont with all ADLs as normal and PT will f/u with pt x 1.5 weeks regarding s/s and next appt on 12/30   *DHI: 20 (20-39%)        Patient stated goal: \"no dizziness\"  [x]? Progressing: []? Met: []? Not Met: []? Adjusted     Therapist goals for Patient:   Short Term Goals: To be achieved in: 2 weeks  1.  Independent in HEP and progression per patient tolerance, in order to prevent re-injury. [x]? Progressing: []? Met: []? Not Met: []? Adjusted  2. Patient will have a decrease in dizziness/imbalance/symptoms by 30-40% to facilitate improvement in movement, function, balance and ADLs as indicated by Functional Deficits. []? Progressing: [x]? Met: []? Not Met: []? Adjusted     Long Term Goals: To be achieved in at discharge  1. Disability index score of 11 or less for the [facility] to assist with reaching prior level of function. [x]? Progressing: []? Met: []? Not Met: []? Adjusted   2. Patient will have a decrease in dizziness/imbalance/symptoms by 70-80% to facilitate improvement in movement, function, balance and ADLs as indicated by Functional Deficits. []? Progressing: [x]? Met: []? Not Met: []? Adjusted  3. Patient will demonstrate negative oculomotor special testing/positional testing as indicated by patients Functional Deficits. [x]? Progressing: []? Met: []? Not Met: []? Adjusted  4. Patient will return to functional activities including bed mobility without increased symptoms or restriction. [x]? Progressing: []? Met: []? Not Met: []? Adjusted  5. \"Bed mobility and softball without dizziness\"   [x]? Progressing: []? Met: []? Not Met: []? Adjusted         ASSESSMENT:  12/2: all positional testing negative still; cont to have very sensitive reaction to vestibular exercises     Treatment/Activity Tolerance:  [x] Patient tolerated treatment well [] Patient limited by fatique  [] Patient limited by pain  [] Patient limited by other medical complications  [] Other:     Overall Progression Towards Functional goals/ Treatment Progress Update:  [] Patient is progressing as expected towards functional goals listed. [x] Progression is slowed due to complexities/Impairments listed. [] Progression has been slowed due to co-morbidities.   [] Plan just implemented, too soon to assess goals progression <30days   [] Goals require adjustment due to lack of progress  [] Patient is not progressing as expected and requires additional follow up with physician  [] Other    Prognosis for POC: [x] Good [] Fair  [] Poor    Patient requires continued skilled intervention: [x] Yes  [] No        PLAN: Assess on 12/28 via phone call   [x] Continue per plan of care [] Alter current plan (see comments)  [] Plan of care initiated [] Hold pending MD visit [] Discharge    Electronically signed by: Deb Cameron, PT MPT [id number]    Note: If patient does not return for scheduled/recommended follow up visits, this note will serve as a discharge from care along with the most recent update on progress.

## 2021-12-28 NOTE — PLAN OF CARE
[place] [facility], [facility]  [address] [facility]  Phone: [phone]   Fax:     [phone]      Physical Therapy Discharge Summary    Dear Dr. Khurram Hunter,    We had the pleasure of treating the following patient for physical therapy services at [address]. A summary of our findings can be found in the discharge summary below. This includes our final assessment. If you have any questions or concerns regarding these findings, please do not hesitate to contact me at the office phone number checked above. Thank you for the referral.       Physician Signature:________________________________Date:__________________  By signing above, therapists plan is approved by physician          Patient: Brenton Magdaleno   : 1950   MRN: [id number]  Referring Physician:        Evaluation Date: 2021        Medical/Treatment Diagnosis Information:  · Diagnosis: R42 vertigo  · Treatment Diagnosis: dizziness limiting balance, gait and ADLs     Insurance/Certification information:  Nayeli Hargrove    Date Range of Treatment:  10/8/21-21  Total visits:8    Functional Outcomes Measure: at discharge  Test:DHI  Score:0    SUBJECTIVE: No dizziness c/o for about 2 weeks. Pain Scale: 0/10    OBJECTIVE: Feliciano Hallpike positional testing for BPPV negative.          ASSESSMENT: D/C from PT due to improvement of dizziness       Response to Treatment:   [x]Patient met all goals and has responded well to treatment and will continue HEP   []Patient should continue to improve in reasonable time if they continue HEP   []Patient has plateaued and is no longer responding to skilled PT intervention    []Patient is getting worse and would benefit from return to referring MD   []Patient unable to adhere to initial POC      GOALS: met           Reason for Discharge:  [x] Goals Met   [] Patient did not return after initial evaluation   [] Progress Plateaued  [] Missed _____ scheduled appointments   [] No insurance coverage [] Patient terminated therapy   [] MD discharged from PT [] Financial Limitations  [] Other:      Electronically signed by:  Regine Bernstein, PT, MPT [id number]

## 2021-12-30 ENCOUNTER — APPOINTMENT (OUTPATIENT)
Dept: PHYSICAL THERAPY | Age: 71
End: 2021-12-30
Payer: MEDICARE

## 2022-01-25 ENCOUNTER — TELEPHONE (OUTPATIENT)
Dept: FAMILY MEDICINE CLINIC | Age: 72
End: 2022-01-25

## 2022-01-25 NOTE — TELEPHONE ENCOUNTER
----- Message from Gurwinder Gallegos sent at 1/25/2022  4:17 PM EST -----  Subject: Message to Provider    QUESTIONS  Information for Provider? Patient is wanting some blood test done at   office on the day of the appointment. Needs done is CMP 14, CBCDP, LPTP,   Iron, ferritin, Vitamin D, Hasrp, MG, PO4, UA, and urnialysis. ---------------------------------------------------------------------------  --------------  [place] INFO  What is the best way for the office to contact you? OK to leave message on   voicemail  Preferred Call Back Phone Number? [phone]  ---------------------------------------------------------------------------  --------------  SCRIPT ANSWERS  Relationship to Patient?  Self

## 2022-01-27 ENCOUNTER — OFFICE VISIT (OUTPATIENT)
Dept: FAMILY MEDICINE CLINIC | Age: 72
End: 2022-01-27
Payer: MEDICARE

## 2022-01-27 VITALS
BODY MASS INDEX: 36.32 KG/M2 | HEART RATE: 61 BPM | DIASTOLIC BLOOD PRESSURE: 82 MMHG | WEIGHT: 218 LBS | HEIGHT: 65 IN | OXYGEN SATURATION: 100 % | TEMPERATURE: 97.1 F | SYSTOLIC BLOOD PRESSURE: 122 MMHG

## 2022-01-27 DIAGNOSIS — H26.9 CATARACT OF BOTH EYES, UNSPECIFIED CATARACT TYPE: ICD-10-CM

## 2022-01-27 DIAGNOSIS — Z01.818 PREOP EXAMINATION: Primary | ICD-10-CM

## 2022-01-27 DIAGNOSIS — Z00.00 WELL ADULT EXAM: ICD-10-CM

## 2022-01-27 DIAGNOSIS — R53.83 FATIGUE, UNSPECIFIED TYPE: ICD-10-CM

## 2022-01-27 DIAGNOSIS — E55.9 VITAMIN D DEFICIENCY: ICD-10-CM

## 2022-01-27 DIAGNOSIS — M25.50 POLYARTHRALGIA: ICD-10-CM

## 2022-01-27 LAB
A/G RATIO: 2 (ref 1.1–2.2)
ALBUMIN SERPL-MCNC: 4.7 G/DL (ref 3.4–5)
ALP BLD-CCNC: 72 U/L (ref 40–129)
ALT SERPL-CCNC: 28 U/L (ref 10–40)
ANION GAP SERPL CALCULATED.3IONS-SCNC: 17 MMOL/L (ref 3–16)
AST SERPL-CCNC: 20 U/L (ref 15–37)
BILIRUB SERPL-MCNC: 0.5 MG/DL (ref 0–1)
BUN BLDV-MCNC: 17 MG/DL (ref 7–20)
C-REACTIVE PROTEIN: <3 MG/L (ref 0–5.1)
CALCIUM SERPL-MCNC: 10 MG/DL (ref 8.3–10.6)
CHLORIDE BLD-SCNC: 98 MMOL/L (ref 99–110)
CHOLESTEROL, TOTAL: 229 MG/DL (ref 0–199)
CO2: 25 MMOL/L (ref 21–32)
CREAT SERPL-MCNC: 1.2 MG/DL (ref 0.6–1.2)
FERRITIN: 154.1 NG/ML (ref 15–150)
GFR AFRICAN AMERICAN: 53
GFR NON-AFRICAN AMERICAN: 44
GLUCOSE BLD-MCNC: 100 MG/DL (ref 70–99)
HCT VFR BLD CALC: 47.1 % (ref 36–48)
HDLC SERPL-MCNC: 71 MG/DL (ref 40–60)
HEMOGLOBIN: 15.6 G/DL (ref 12–16)
IRON: 106 UG/DL (ref 37–145)
LDL CHOLESTEROL CALCULATED: 130 MG/DL
MAGNESIUM: 2.4 MG/DL (ref 1.8–2.4)
MCH RBC QN AUTO: 30.2 PG (ref 26–34)
MCHC RBC AUTO-ENTMCNC: 33.2 G/DL (ref 31–36)
MCV RBC AUTO: 90.9 FL (ref 80–100)
PDW BLD-RTO: 12.8 % (ref 12.4–15.4)
PHOSPHORUS: 3.9 MG/DL (ref 2.5–4.9)
PLATELET # BLD: 207 K/UL (ref 135–450)
PMV BLD AUTO: 10.2 FL (ref 5–10.5)
POTASSIUM SERPL-SCNC: 3.9 MMOL/L (ref 3.5–5.1)
RBC # BLD: 5.19 M/UL (ref 4–5.2)
SODIUM BLD-SCNC: 140 MMOL/L (ref 136–145)
TOTAL PROTEIN: 7.1 G/DL (ref 6.4–8.2)
TRIGL SERPL-MCNC: 138 MG/DL (ref 0–150)
VITAMIN D 25-HYDROXY: 43.2 NG/ML
VLDLC SERPL CALC-MCNC: 28 MG/DL
WBC # BLD: 8.9 K/UL (ref 4–11)

## 2022-01-27 PROCEDURE — 99214 OFFICE O/P EST MOD 30 MIN: CPT | Performed by: FAMILY MEDICINE

## 2022-01-27 PROCEDURE — 36415 COLL VENOUS BLD VENIPUNCTURE: CPT | Performed by: FAMILY MEDICINE

## 2022-01-27 RX ORDER — PREDNISONE 1 MG/1
TABLET ORAL
COMMUNITY
Start: 2022-01-21 | End: 2022-10-25 | Stop reason: HOSPADM

## 2022-01-27 ASSESSMENT — ENCOUNTER SYMPTOMS
RESPIRATORY NEGATIVE: 1
GASTROINTESTINAL NEGATIVE: 1

## 2022-01-28 LAB — URINE CULTURE, ROUTINE: NORMAL

## 2022-02-02 ASSESSMENT — LIFESTYLE VARIABLES
HOW OFTEN DURING THE LAST YEAR HAVE YOU NEEDED AN ALCOHOLIC DRINK FIRST THING IN THE MORNING TO GET YOURSELF GOING AFTER A NIGHT OF HEAVY DRINKING: NEVER
HOW OFTEN DURING THE LAST YEAR HAVE YOU FAILED TO DO WHAT WAS NORMALLY EXPECTED FROM YOU BECAUSE OF DRINKING: NEVER
HOW OFTEN DURING THE LAST YEAR HAVE YOU BEEN UNABLE TO REMEMBER WHAT HAPPENED THE NIGHT BEFORE BECAUSE YOU HAD BEEN DRINKING: 0
HOW MANY STANDARD DRINKS CONTAINING ALCOHOL DO YOU HAVE ON A TYPICAL DAY: ONE OR TWO
HAVE YOU OR SOMEONE ELSE BEEN INJURED AS A RESULT OF YOUR DRINKING: NO
AUDIT-C TOTAL SCORE: 1
AUDIT TOTAL SCORE: 1
HOW OFTEN DURING THE LAST YEAR HAVE YOU NEEDED AN ALCOHOLIC DRINK FIRST THING IN THE MORNING TO GET YOURSELF GOING AFTER A NIGHT OF HEAVY DRINKING: 0
HOW MANY STANDARD DRINKS CONTAINING ALCOHOL DO YOU HAVE ON A TYPICAL DAY: 0
HAS A RELATIVE, FRIEND, DOCTOR, OR ANOTHER HEALTH PROFESSIONAL EXPRESSED CONCERN ABOUT YOUR DRINKING OR SUGGESTED YOU CUT DOWN: NO
HOW OFTEN DURING THE LAST YEAR HAVE YOU HAD A FEELING OF GUILT OR REMORSE AFTER DRINKING: 0
HAS A RELATIVE, FRIEND, DOCTOR, OR ANOTHER HEALTH PROFESSIONAL EXPRESSED CONCERN ABOUT YOUR DRINKING OR SUGGESTED YOU CUT DOWN: 0
AUDIT TOTAL SCORE: 0
HOW OFTEN DO YOU HAVE A DRINK CONTAINING ALCOHOL: MONTHLY OR LESS
HOW OFTEN DO YOU HAVE SIX OR MORE DRINKS ON ONE OCCASION: NEVER
HAVE YOU OR SOMEONE ELSE BEEN INJURED AS A RESULT OF YOUR DRINKING: 0
AUDIT-C TOTAL SCORE: 0
HOW OFTEN DO YOU HAVE SIX OR MORE DRINKS ON ONE OCCASION: 0
HOW OFTEN DURING THE LAST YEAR HAVE YOU FOUND THAT YOU WERE NOT ABLE TO STOP DRINKING ONCE YOU HAD STARTED: 0
HOW OFTEN DURING THE LAST YEAR HAVE YOU FAILED TO DO WHAT WAS NORMALLY EXPECTED FROM YOU BECAUSE OF DRINKING: 0
HOW OFTEN DO YOU HAVE A DRINK CONTAINING ALCOHOL: 1
HOW OFTEN DURING THE LAST YEAR HAVE YOU BEEN UNABLE TO REMEMBER WHAT HAPPENED THE NIGHT BEFORE BECAUSE YOU HAD BEEN DRINKING: NEVER
HOW OFTEN DURING THE LAST YEAR HAVE YOU HAD A FEELING OF GUILT OR REMORSE AFTER DRINKING: NEVER
HOW OFTEN DURING THE LAST YEAR HAVE YOU FOUND THAT YOU WERE NOT ABLE TO STOP DRINKING ONCE YOU HAD STARTED: NEVER

## 2022-02-02 ASSESSMENT — PATIENT HEALTH QUESTIONNAIRE - PHQ9
SUM OF ALL RESPONSES TO PHQ QUESTIONS 1-9: 0
2. FEELING DOWN, DEPRESSED OR HOPELESS: 0
SUM OF ALL RESPONSES TO PHQ QUESTIONS 1-9: 0
1. LITTLE INTEREST OR PLEASURE IN DOING THINGS: 0
SUM OF ALL RESPONSES TO PHQ9 QUESTIONS 1 & 2: 0

## 2022-02-03 ENCOUNTER — VIRTUAL VISIT (OUTPATIENT)
Dept: FAMILY MEDICINE CLINIC | Age: 72
End: 2022-02-03
Payer: MEDICARE

## 2022-02-03 DIAGNOSIS — Z00.00 ROUTINE GENERAL MEDICAL EXAMINATION AT A HEALTH CARE FACILITY: Primary | ICD-10-CM

## 2022-02-03 PROCEDURE — G0438 PPPS, INITIAL VISIT: HCPCS | Performed by: FAMILY MEDICINE

## 2022-02-03 NOTE — PATIENT INSTRUCTIONS
Personalized Preventive Plan for Darrick Dorsey - 2/3/2022  Medicare offers a range of preventive health benefits. Some of the tests and screenings are paid in full while other may be subject to a deductible, co-insurance, and/or copay. Some of these benefits include a comprehensive review of your medical history including lifestyle, illnesses that may run in your family, and various assessments and screenings as appropriate. After reviewing your medical record and screening and assessments performed today your provider may have ordered immunizations, labs, imaging, and/or referrals for you. A list of these orders (if applicable) as well as your Preventive Care list are included within your After Visit Summary for your review. Other Preventive Recommendations:    · A preventive eye exam performed by an eye specialist is recommended every 1-2 years to screen for glaucoma; cataracts, macular degeneration, and other eye disorders. · A preventive dental visit is recommended every 6 months. · Try to get at least 150 minutes of exercise per week or 10,000 steps per day on a pedometer . · Order or download the FREE \"Exercise & Physical Activity: Your Everyday Guide\" from [employer]. Call 2-969.983.3733 or search [employer] online. · You need 4692-6789 mg of calcium and 7200-4385 IU of vitamin D per day. It is possible to meet your calcium requirement with diet alone, but a vitamin D supplement is usually necessary to meet this goal.  · When exposed to the sun, use a sunscreen that protects against both UVA and UVB radiation with an SPF of 30 or greater. Reapply every 2 to 3 hours or after sweating, drying off with a towel, or swimming. · Always wear a seat belt when traveling in a car. Always wear a helmet when riding a bicycle or motorcycle.

## 2022-02-03 NOTE — PROGRESS NOTES
Medicare Annual Wellness Visit  Name: Lori Ch Date: 2/3/2022   MRN: [id number] Sex: Female   Age: 66 y.o. Ethnicity: Non- / Non    : 1950 Race: White (non-)      Leo Levy is here for No chief complaint on file. Screenings for behavioral, psychosocial and functional/safety risks, and cognitive dysfunction are all negative except as indicated below. These results, as well as other patient data from the [address] form, are documented in Flowsheets linked to this Encounter. Allergies   Allergen Reactions    Bee Venom Swelling    Poison Ivy Extract     Morphine Nausea And Vomiting    Penicillins Rash       Prior to Visit Medications    Medication Sig Taking?  Authorizing Provider   predniSONE (DELTASONE) 5 MG tablet   Historical Provider, MD       Past Medical History:   Diagnosis Date    Cancer [facility])     breast       Past Surgical History:   Procedure Laterality Date    CHOLECYSTECTOMY      FINGER SURGERY      HYSTERECTOMY      MASTECTOMY, BILATERAL Bilateral 2012     BILATERAL SIMPLE MASTECTOMIES WITH FROZEN SECTION    OTHER SURGICAL HISTORY      removal blood clot above knee subsequent to injury    OTHER SURGICAL HISTORY  10/23/2012    Removal soft tissue lateral and medial aspests of bilateral mastetomy sites    TONSILLECTOMY         Family History   Problem Relation Age of Onset    High Cholesterol Mother     Diabetes Mother     High Blood Pressure Father     High Cholesterol Father     Heart Disease Father     Cancer Sister         lung    Cancer Brother         leukemia, remission    Cancer Maternal Aunt         x2 breast 1 colon       CareTeam (Including outside providers/suppliers regularly involved in providing care):   Patient Care Team:  Elicia Cruz DO as PCP - General (Family Medicine)  Elicia Cruz DO as PCP - REHABILITATION [facility] Empaneled Provider    Wt Readings from Last 3 Encounters:   22 218 lb (98.9 kg) 08/10/21 222 lb (100.7 kg)   07/20/21 222 lb (100.7 kg)     No flowsheet data found. There is no height or weight on file to calculate BMI. Based upon direct observation of the patient, evaluation of cognition reveals recent and remote memory intact. Patient's complete Health Risk Assessment and screening values have been reviewed and are found in Flowsheets. The following problems were reviewed today and where indicated follow up appointments were made and/or referrals ordered. Positive Risk Factor Screenings with Interventions:             Health Habits/Nutrition:  Health Habits/Nutrition  Do you exercise for at least 20 minutes 2-3 times per week?: (!) No  Have you lost any weight without trying in the past 3 months?: No  Do you eat only one meal per day?: No  Have you seen the dentist within the past year?: Yes     Health Habits/Nutrition Interventions:  More physically active in the summer when she plays softball     Safety:  Safety  Do you have working smoke detectors?: Yes  Have all throw rugs been removed or fastened?: (!) No  Do you have non-slip mats or surfaces in all bathtubs/showers?: (!) No  Do all of your stairways have a railing or banister?: Yes  Are your doorways, halls and stairs free of clutter?: Yes  Do you always fasten your seatbelt when you are in a car?: Yes  Safety Interventions:  · Home safety tips provided       Personalized Preventive Plan   Current Health Maintenance Status    There is no immunization history on file for this patient.      Health Maintenance   Topic Date Due    COVID-19 Vaccine (1) Never done    DTaP/Tdap/Td vaccine (1 - Tdap) Never done    Shingles Vaccine (1 of 2) Never done    Pneumococcal 65+ years Vaccine (1 of 1 - PPSV23) Never done   Rosalene Jordi Annual Wellness Visit (AWV)  Never done    Flu vaccine (1) Never done    Colon cancer screen fecal DNA test (Cologuard)  12/10/2022    Depression Screen  02/02/2023    Lipid screen  01/27/2027    DEXA (modify frequency per FRAX score)  Completed    Hepatitis C screen  Completed    Hepatitis A vaccine  Aged Out    Hepatitis B vaccine  Aged Out    Hib vaccine  Aged Out    Meningococcal (ACWY) vaccine  Aged Out     Recommendations for LUXA Due: see orders and patient instructions/AVS.  . Recommended screening schedule for the next 5-10 years is provided to the patient in written form: see Patient Instructions/AVS.    Diagnoses and all orders for this visit:    Routine general medical examination at a health care facility               Montez Rg is a 66 y.o. female being evaluated by a Virtual Visit (video and audio) encounter to address concerns as mentioned above. A caregiver was present when appropriate. Due to this being a TeleHealth encounter (During [facility] public health emergency), evaluation of the following organ systems was limited: Vitals/Constitutional/EENT/Resp/CV/GI//MS/Neuro/Skin/Heme-Lymph-Imm. Pursuant to the emergency declaration under the [address] [place] [address] authority and the [employer] Act, this Virtual Visit was conducted with patient's (and/or legal guardian's) consent, to reduce the patient's risk of exposure to COVID-19 and provide necessary medical care. The patient (and/or legal guardian) has also been advised to contact this office for worsening conditions or problems, and seek emergency medical treatment and/or call 911 if deemed necessary. Patient identification was verified at the start of the visit: Yes    Services were provided through a video synchronous discussion virtually to substitute for in-person clinic visit. Patient and provider were located at their individual homes. --Shawna Alberts DO on 2/3/2022 at 10:35 AM    An electronic signature was used to authenticate this note.

## 2022-10-17 ENCOUNTER — HOSPITAL ENCOUNTER (OUTPATIENT)
Dept: GENERAL RADIOLOGY | Age: 72
Discharge: HOME | End: 2022-10-17

## 2022-10-17 ENCOUNTER — TELEPHONE (OUTPATIENT)
Dept: FAMILY MEDICINE CLINIC | Age: 72
End: 2022-10-17

## 2022-10-17 ENCOUNTER — HOSPITAL ENCOUNTER (EMERGENCY)
Age: 72
Discharge: STILL ADMITTED OR ADMINISTRATIVE | End: 2022-10-17

## 2022-10-17 ENCOUNTER — HOSPITAL ENCOUNTER (OUTPATIENT)
Age: 72
Discharge: HOME | End: 2022-10-17

## 2022-10-17 DIAGNOSIS — J40 BRONCHITIS: Primary | ICD-10-CM

## 2022-10-17 DIAGNOSIS — J40 BRONCHITIS: ICD-10-CM

## 2022-10-17 PROCEDURE — 71046 X-RAY EXAM CHEST 2 VIEWS: CPT

## 2022-10-17 NOTE — TELEPHONE ENCOUNTER
Patient states she has been fighting bronchitis for over a month. States she was out of town a month ago and she ended up going to Urgent Care for bronchitis. States they gave her an antibiotic and steroid shot, then sent her home with a Medrol pack, antibiotic, and cough medication. Patient states she finished the medication 2 weeks ago and she still is not feeling better. States she is presently coughing up yellow-green, sometimes white copious mucous from her chest, non-stop productive cough, sore throat, runny nose, and low energy. States she has taken Musinex to try to help. Patient thinks she may need another antibiotic. Patient did take a Covid test yesterday and it was Negative. Please advise.      [address] [place] [address],[address], [address] - f [phone]

## 2022-10-25 ENCOUNTER — OFFICE VISIT (OUTPATIENT)
Dept: FAMILY MEDICINE CLINIC | Age: 72
End: 2022-10-25
Payer: MEDICARE

## 2022-10-25 VITALS
WEIGHT: 210 LBS | HEIGHT: 65 IN | BODY MASS INDEX: 34.99 KG/M2 | SYSTOLIC BLOOD PRESSURE: 128 MMHG | DIASTOLIC BLOOD PRESSURE: 80 MMHG

## 2022-10-25 DIAGNOSIS — J40 BRONCHITIS: Primary | ICD-10-CM

## 2022-10-25 PROCEDURE — 99213 OFFICE O/P EST LOW 20 MIN: CPT | Performed by: FAMILY MEDICINE

## 2022-10-25 PROCEDURE — 1123F ACP DISCUSS/DSCN MKR DOCD: CPT | Performed by: FAMILY MEDICINE

## 2022-10-25 RX ORDER — PREDNISONE 10 MG/1
10 TABLET ORAL DAILY
Qty: 18 TABLET | Refills: 0 | Status: SHIPPED | OUTPATIENT
Start: 2022-10-25 | End: 2023-10-25

## 2022-10-25 RX ORDER — BENZONATATE 200 MG/1
200 CAPSULE ORAL 3 TIMES DAILY PRN
Qty: 30 CAPSULE | Refills: 0 | Status: SHIPPED | OUTPATIENT
Start: 2022-10-25 | End: 2022-11-22

## 2022-10-25 RX ORDER — DOXYCYCLINE HYCLATE 100 MG
100 TABLET ORAL 2 TIMES DAILY
Qty: 20 TABLET | Refills: 0 | Status: SHIPPED | OUTPATIENT
Start: 2022-10-25 | End: 2022-11-04

## 2022-10-25 SDOH — ECONOMIC STABILITY: FOOD INSECURITY: WITHIN THE PAST 12 MONTHS, THE FOOD YOU BOUGHT JUST DIDN'T LAST AND YOU DIDN'T HAVE MONEY TO GET MORE.: NEVER TRUE

## 2022-10-25 SDOH — ECONOMIC STABILITY: FOOD INSECURITY: WITHIN THE PAST 12 MONTHS, YOU WORRIED THAT YOUR FOOD WOULD RUN OUT BEFORE YOU GOT MONEY TO BUY MORE.: NEVER TRUE

## 2022-10-25 ASSESSMENT — PATIENT HEALTH QUESTIONNAIRE - PHQ9
SUM OF ALL RESPONSES TO PHQ9 QUESTIONS 1 & 2: 0
2. FEELING DOWN, DEPRESSED OR HOPELESS: 0
1. LITTLE INTEREST OR PLEASURE IN DOING THINGS: 0
SUM OF ALL RESPONSES TO PHQ QUESTIONS 1-9: 0

## 2022-10-25 ASSESSMENT — ENCOUNTER SYMPTOMS
RHINORRHEA: 1
SHORTNESS OF BREATH: 1
COUGH: 1

## 2022-10-25 ASSESSMENT — SOCIAL DETERMINANTS OF HEALTH (SDOH): HOW HARD IS IT FOR YOU TO PAY FOR THE VERY BASICS LIKE FOOD, HOUSING, MEDICAL CARE, AND HEATING?: NOT HARD AT ALL

## 2022-10-26 NOTE — PROGRESS NOTES
Ulises Keenan (:  1950) is a 67 y.o. female,Established patient, here for evaluation of the following chief complaint(s):  Congestion (Negative for COVID X 2 days ago )         ASSESSMENT/PLAN:  1. Bronchitis      No follow-ups on file. Subjective   SUBJECTIVE/OBJECTIVE:  Cough  This is a recurrent problem. The current episode started 1 to 4 weeks ago. The problem has been unchanged. The problem occurs every few minutes. The cough is Productive of sputum. Associated symptoms include myalgias, nasal congestion, postnasal drip, rhinorrhea and shortness of breath. Treatments tried: had antibiotic and steriod pack. There is no history of bronchitis. Review of Systems   HENT:  Positive for postnasal drip and rhinorrhea. Respiratory:  Positive for cough and shortness of breath. Musculoskeletal:  Positive for myalgias. Objective   Physical Exam  Constitutional:       General: She is not in acute distress. Appearance: She is well-developed. HENT:      Head: Normocephalic. Right Ear: Tympanic membrane, ear canal and external ear normal.      Left Ear: Tympanic membrane, ear canal and external ear normal.      Nose: Mucosal edema present. Mouth/Throat:      Pharynx: Posterior oropharyngeal erythema present. Eyes:      General:         Left eye: Left eye discharge: pnd . Conjunctiva/sclera: Conjunctivae normal.   Neck:      Thyroid: No thyromegaly. Cardiovascular:      Rate and Rhythm: Normal rate. Pulmonary:      Effort: Pulmonary effort is normal. No respiratory distress. Breath sounds: No wheezing or rales. Comments: Harsh upper airway sounds   Lymphadenopathy:      Cervical: Cervical adenopathy present. Skin:     General: Skin is warm and dry. Findings: No rash. Neurological:      Mental Status: She is alert and oriented to person, place, and time. Psychiatric:         Behavior: Behavior normal.         Thought Content:  Thought content normal.         Judgment: Judgment normal.                An electronic signature was used to authenticate this note.     --Patricio Williamson, DO

## 2022-11-22 RX ORDER — BENZONATATE 200 MG/1
200 CAPSULE ORAL 3 TIMES DAILY PRN
Qty: 30 CAPSULE | Refills: 0 | Status: SHIPPED | OUTPATIENT
Start: 2022-11-22 | End: 2022-12-02

## 2022-12-16 ENCOUNTER — OFFICE VISIT (OUTPATIENT)
Dept: FAMILY MEDICINE CLINIC | Age: 72
End: 2022-12-16

## 2022-12-16 VITALS — BODY MASS INDEX: 36.15 KG/M2 | HEIGHT: 65 IN | WEIGHT: 217 LBS

## 2022-12-16 DIAGNOSIS — R05.3 CHRONIC COUGH: Primary | ICD-10-CM

## 2022-12-17 ASSESSMENT — ENCOUNTER SYMPTOMS
CHEST TIGHTNESS: 1
COUGH: 1
GASTROINTESTINAL NEGATIVE: 1

## 2022-12-18 NOTE — PROGRESS NOTES
Subjective:      Patient ID: Rey Skinner is a 67 y.o. female. Cough    Dizziness  Associated symptoms include coughing. Recurrent cough  She can not get rid of this cough  Feels ok but cough can get severe  No fever  Chest xray was normal  None of the medication she has taken has helped  Review of Systems   Constitutional: Negative. HENT: Negative. Respiratory:  Positive for cough and chest tightness. Cardiovascular: Negative. Gastrointestinal: Negative. Neurological:  Positive for dizziness. Psychiatric/Behavioral:  Positive for decreased concentration. Objective:   Physical Exam  Vitals and nursing note reviewed. Constitutional:       Appearance: She is well-developed. HENT:      Head: Normocephalic. Neck:      Thyroid: No thyromegaly. Cardiovascular:      Rate and Rhythm: Normal rate and regular rhythm. Heart sounds: Normal heart sounds. Pulmonary:      Effort: Pulmonary effort is normal.      Breath sounds: Normal breath sounds. Lymphadenopathy:      Cervical: No cervical adenopathy. Neurological:      Mental Status: She is alert and oriented to person, place, and time. Psychiatric:         Behavior: Behavior normal.         Thought Content: Thought content normal.         Judgment: Judgment normal.       Assessment:         Plan:      Assessment/plan;  Zara Osborne was seen today for cough, dizziness and head congestion. Diagnoses and all orders for this visit:    Chronic cough  -     Billy Perry MD, Pulmonary, [facility]      No follow-ups on file.           Nicolette Capone DO

## 2022-12-29 ENCOUNTER — TELEPHONE (OUTPATIENT)
Dept: FAMILY MEDICINE CLINIC | Age: 72
End: 2022-12-29

## 2022-12-29 NOTE — TELEPHONE ENCOUNTER
Pt called to let Dr Mimi Landrum know that she has been having a pain in her back just above her hip on right side. It radiates to the front down below the rib cage. It is a constant pain. This is the third day and it has not went away.  Please give pt a call [phone]

## 2022-12-30 ENCOUNTER — OFFICE VISIT (OUTPATIENT)
Dept: FAMILY MEDICINE CLINIC | Age: 72
End: 2022-12-30

## 2022-12-30 VITALS
WEIGHT: 218 LBS | SYSTOLIC BLOOD PRESSURE: 134 MMHG | HEIGHT: 65 IN | DIASTOLIC BLOOD PRESSURE: 80 MMHG | BODY MASS INDEX: 36.32 KG/M2

## 2022-12-30 DIAGNOSIS — M46.1 SACROILIAC INFLAMMATION (HCC): Primary | ICD-10-CM

## 2022-12-30 RX ORDER — TIZANIDINE 4 MG/1
4 TABLET ORAL EVERY 8 HOURS PRN
Qty: 20 TABLET | Refills: 1 | Status: SHIPPED | OUTPATIENT
Start: 2022-12-30

## 2022-12-30 RX ORDER — MELOXICAM 15 MG/1
15 TABLET ORAL DAILY
Qty: 30 TABLET | Refills: 3 | Status: SHIPPED | OUTPATIENT
Start: 2022-12-30

## 2022-12-30 RX ORDER — METHYLPREDNISOLONE 4 MG/1
TABLET ORAL
Qty: 1 KIT | Refills: 0 | Status: SHIPPED | OUTPATIENT
Start: 2022-12-30 | End: 2023-01-05

## 2022-12-30 ASSESSMENT — ENCOUNTER SYMPTOMS: BACK PAIN: 1

## 2022-12-31 NOTE — PROGRESS NOTES
Abdi Canada (:  1950) is a 67 y.o. female,Established patient, here for evaluation of the following chief complaint(s):  Back Pain (Radiating through right side under ribs/x4days)         ASSESSMENT/PLAN:  1. Sacroiliac inflammation (HCC)  -     methylPREDNISolone (MEDROL DOSEPACK) 4 MG tablet; Take by mouth., Disp-1 kit, R-0Normal  -     tiZANidine (ZANAFLEX) 4 MG tablet; Take 1 tablet by mouth every 8 hours as needed (muscle spasm), Disp-20 tablet, R-1Normal  -     meloxicam (MOBIC) 15 MG tablet; Take 1 tablet by mouth daily, Disp-30 tablet, R-3Normal      No follow-ups on file. Subjective   SUBJECTIVE/OBJECTIVE:  Back Pain  This is a new problem. The current episode started in the past 7 days. The problem has been gradually worsening since onset. The quality of the pain is described as aching. The pain radiates to the left thigh and right thigh. The pain is moderate. The symptoms are aggravated by twisting, position and bending. Associated symptoms include pelvic pain. Review of Systems   Constitutional:  Positive for activity change and fatigue. Genitourinary:  Positive for pelvic pain. Musculoskeletal:  Positive for back pain and myalgias. Objective   Physical Exam  Vitals and nursing note reviewed. Constitutional:       Appearance: She is well-developed. HENT:      Head: Normocephalic. Cardiovascular:      Rate and Rhythm: Normal rate and regular rhythm. Heart sounds: Normal heart sounds. Pulmonary:      Effort: Pulmonary effort is normal.      Breath sounds: Normal breath sounds. Abdominal:      General: There is no distension. Palpations: Abdomen is soft. There is no mass. Musculoskeletal:      Comments: bilat lumbar spasm  Decrease rom in all fields    dtr  bilat    Neurological:      Mental Status: She is alert and oriented to person, place, and time. Cranial Nerves: No cranial nerve deficit.       Deep Tendon Reflexes: Reflexes are normal and symmetric. Psychiatric:         Behavior: Behavior normal.         Thought Content: Thought content normal.         Judgment: Judgment normal.                An electronic signature was used to authenticate this note.     --Dena Bhandari, DO

## 2023-01-04 ENCOUNTER — OFFICE VISIT (OUTPATIENT)
Dept: PULMONOLOGY | Age: 73
End: 2023-01-04
Payer: MEDICARE

## 2023-01-04 VITALS
WEIGHT: 218 LBS | TEMPERATURE: 97.7 F | BODY MASS INDEX: 36.32 KG/M2 | RESPIRATION RATE: 18 BRPM | HEIGHT: 65 IN | SYSTOLIC BLOOD PRESSURE: 124 MMHG | HEART RATE: 60 BPM | OXYGEN SATURATION: 99 % | DIASTOLIC BLOOD PRESSURE: 82 MMHG

## 2023-01-04 DIAGNOSIS — R06.00 PND (PAROXYSMAL NOCTURNAL DYSPNEA): Primary | ICD-10-CM

## 2023-01-04 DIAGNOSIS — R05.3 CHRONIC COUGH: ICD-10-CM

## 2023-01-04 PROCEDURE — 99204 OFFICE O/P NEW MOD 45 MIN: CPT | Performed by: INTERNAL MEDICINE

## 2023-01-04 PROCEDURE — 1123F ACP DISCUSS/DSCN MKR DOCD: CPT | Performed by: INTERNAL MEDICINE

## 2023-01-04 RX ORDER — FLUTICASONE PROPIONATE 50 MCG
2 SPRAY, SUSPENSION (ML) NASAL DAILY
Qty: 2 EACH | Refills: 5 | Status: SHIPPED | OUTPATIENT
Start: 2023-01-04

## 2023-01-04 NOTE — PROGRESS NOTES
Pulmonary Critical Care Consult           REASON FOR CONSULTATION:  Chief Complaint   Patient presents with    Establish Care    Cough        Consult at request of Jennifer Berg DO     PCP: Jennifer Berg DO        Assessment and Plan:   Diagnosis Orders   1. PND (paroxysmal nocturnal dyspnea)  fluticasone (FLONASE) 50 MCG/ACT nasal spray      2. Chronic cough            Plan:  Post infectious cough  INS  Antihistamine   If persisted will get PFT to r/o cough variant asthma          HISTORY OF PRESENT ILLNESS: Kaur Green is very pleasant 67y.o. year old lady with medical history stated below significant for lifelong non smoker, no prior h/o asthma or copd, was referred to us for chronic cough. Had URI and since that infection her cough persisted. No sob, no wheezing, no hemoptysis, no chest pain, no fever    Had CXR 10/22: normal      Past Medical History:   Diagnosis Date    Cancer [facility])     breast       Past Surgical History:   Procedure Laterality Date    CHOLECYSTECTOMY      FINGER SURGERY      HYSTERECTOMY (CERVIX STATUS UNKNOWN)      MASTECTOMY, BILATERAL Bilateral 07/17/2012     BILATERAL SIMPLE MASTECTOMIES WITH FROZEN SECTION    OTHER SURGICAL HISTORY      removal blood clot above knee subsequent to injury    OTHER SURGICAL HISTORY  10/23/2012    Removal soft tissue lateral and medial aspests of bilateral mastetomy sites    TONSILLECTOMY         family history includes Cancer in her brother, maternal aunt, and sister; Diabetes in her mother; Heart Disease in her father; High Blood Pressure in her father; High Cholesterol in her father and mother. SOCIAL HISTORY:   reports that she has never smoked. She has been exposed to tobacco smoke. She has never used smokeless tobacco.      ALLERGIES:  Patient is allergic to bee venom, poison ivy extract, morphine, and penicillins.     REVIEW OF SYSTEMS:  Constitutional: Negative for fever, no wt loss, no night sweats   HENT: Negative for sore throat, difficulty swallowing,   Eyes: Negative for redness, no discharge   Respiratory: cough  Cardiovascular: Negative for chest pain, no palpitations   Gastrointestinal: Negative for vomiting, diarrhea   Genitourinary: Negative for hematuria, no dysuria    Musculoskeletal: Negative for arthralgias, no joint swelling   Skin: Negative for rash  LE: no edema   Neurological: Negative for syncope, no tremor, no focal weakness or dysarthria   Hematological: Negative for adenopathy, or bleeding   Psychiatric/Behavorial: Negative for anxiety,    Objective:   PHYSICAL EXAM:  Blood pressure 124/82, pulse 60, temperature 97.7 °F (36.5 °C), resp. rate 18, height 5' 5\" (1.651 m), weight 218 lb (98.9 kg), SpO2 99 %.'  Gen: No acute distress  Eyes: PERRL. No sclera icterus. No conjunctival injection. ENT: inflamed mucosa   Neck: Trachea midline. No obvious mass. Resp: No accessory muscle use. No crackles. No wheezes. No rhonchi. CV: Regular rate. Regular rhythm. No murmur or rub. No edema. GI: Non-tender. Non-distended. No hernia. Skin: Warm, dry, normal texture and turgor. No nodule on exposed extremities. Lymph: No cervical LAD. M/S: No cyanosis. No clubbing. No joint deformity. LE:  no edema   Neuro: no tremor, no focal deficit, awake and alert   Psych: ntact judgement and insight. Current Outpatient Medications   Medication Sig Dispense Refill    fluticasone (FLONASE) 50 MCG/ACT nasal spray 2 sprays by Each Nostril route daily 2 each 5    tiZANidine (ZANAFLEX) 4 MG tablet Take 1 tablet by mouth every 8 hours as needed (muscle spasm) 20 tablet 1    meloxicam (MOBIC) 15 MG tablet Take 1 tablet by mouth daily 30 tablet 3     No current facility-administered medications for this visit.        Data Reviewed:   CBC and Renal reviewed  Last CBC  Lab Results   Component Value Date/Time    WBC 8.9 01/27/2022 10:44 AM    RBC 5.19 01/27/2022 10:44 AM    HGB 15.6 01/27/2022 10:44 AM    MCV 90.9 01/27/2022 10:44 AM     01/27/2022 10:44 AM     Last Renal  Lab Results   Component Value Date/Time     01/27/2022 10:44 AM    K 3.9 01/27/2022 10:44 AM    CL 98 01/27/2022 10:44 AM    CO2 25 01/27/2022 10:44 AM    CO2 26 06/28/2021 10:08 AM    CO2 23 03/09/2018 09:43 AM    BUN 17 01/27/2022 10:44 AM    CREATININE 1.2 01/27/2022 10:44 AM    GLUCOSE 100 01/27/2022 10:44 AM    CALCIUM 10.0 01/27/2022 10:44 AM       Last ABG  POC Blood Gas: No results found for: POCPH, POCPCO2, POCPO2, POCHCO3, NBEA, VKHV7LTT  No results for input(s): PH, PCO2, PO2, HCO3, BE, O2SAT in the last 72 hours. Radiology Review:  Pertinent images / reports were reviewed as a part of this visit. CT Chest w/ contrast: No results found for this or any previous visit. CT Chest w/o contrast: No results found for this or any previous visit. CTPA: No results found for this or any previous visit. CXR PA/LAT: Results for orders placed during the hospital encounter of 10/17/22    XR CHEST (2 VW)    Narrative  EXAMINATION:  TWO XRAY VIEWS OF THE CHEST    10/17/2022 12:34 pm    COMPARISON:  None. HISTORY:  ORDERING SYSTEM PROVIDED HISTORY: Bronchitis  TECHNOLOGIST PROVIDED HISTORY:  Reason for Exam: Bronchitis    FINDINGS:  Medical devices: None. Mediastinum/Heart: The cardiomediastinal contours are normal.    Lungs: The lungs are clear. Pleura: No evidence of pleural effusion or pneumothorax. Bones/Soft tissues: No acute abnormalities are identified. Impression  No acute cardiopulmonary abnormality identified. Pulmonary function testing  None on file        This note was transcribed using [id number]. Please disregard any translational errors.     Abdi Hugo MD  [facility] Pulmonary, Sleep and Critical Care

## 2023-04-04 ENCOUNTER — OFFICE VISIT (OUTPATIENT)
Dept: PULMONOLOGY | Age: 73
End: 2023-04-04
Payer: MEDICARE

## 2023-04-04 VITALS
TEMPERATURE: 97.3 F | BODY MASS INDEX: 36.82 KG/M2 | OXYGEN SATURATION: 95 % | HEART RATE: 93 BPM | DIASTOLIC BLOOD PRESSURE: 82 MMHG | RESPIRATION RATE: 18 BRPM | WEIGHT: 221 LBS | SYSTOLIC BLOOD PRESSURE: 144 MMHG | HEIGHT: 65 IN

## 2023-04-04 DIAGNOSIS — R06.00 PND (PAROXYSMAL NOCTURNAL DYSPNEA): ICD-10-CM

## 2023-04-04 DIAGNOSIS — R05.3 CHRONIC COUGH: Primary | ICD-10-CM

## 2023-04-04 PROCEDURE — 1123F ACP DISCUSS/DSCN MKR DOCD: CPT | Performed by: INTERNAL MEDICINE

## 2023-04-04 PROCEDURE — 99214 OFFICE O/P EST MOD 30 MIN: CPT | Performed by: INTERNAL MEDICINE

## 2023-04-04 RX ORDER — AZELASTINE 1 MG/ML
1 SPRAY, METERED NASAL 2 TIMES DAILY
Qty: 2 ML | Refills: 3 | Status: SHIPPED | OUTPATIENT
Start: 2023-04-04

## 2023-04-04 NOTE — PROGRESS NOTES
testing  None on file        This note was transcribed using [id number]. Please disregard any translational errors.     Latonya Gilbert MD  [facility] Pulmonary, Sleep and Critical Care

## 2023-04-06 ENCOUNTER — HOSPITAL ENCOUNTER (OUTPATIENT)
Dept: PULMONOLOGY | Age: 73
Discharge: HOME | End: 2023-04-06
Payer: MEDICARE

## 2023-04-06 LAB
DLCO %PRED: 154 %
DLCO PRED: NORMAL
DLCO/VA %PRED: NORMAL
DLCO/VA PRED: NORMAL
DLCO/VA: NORMAL
DLCO: NORMAL
EXPIRATORY TIME-POST: NORMAL
EXPIRATORY TIME: NORMAL
FEF 25-75% %CHNG: NORMAL
FEF 25-75% %PRED-POST: NORMAL
FEF 25-75% %PRED-PRE: NORMAL
FEF 25-75% PRED: NORMAL
FEF 25-75%-POST: NORMAL
FEF 25-75%-PRE: NORMAL
FEV1 %PRED-POST: 92 %
FEV1 %PRED-PRE: 87 %
FEV1 PRED: NORMAL
FEV1-POST: NORMAL
FEV1-PRE: NORMAL
FEV1/FVC %PRED-POST: NORMAL
FEV1/FVC %PRED-PRE: NORMAL
FEV1/FVC PRED: NORMAL
FEV1/FVC-POST: NORMAL
FEV1/FVC-PRE: NORMAL
FVC %PRED-POST: 87 L
FVC %PRED-PRE: 81 %
FVC PRED: NORMAL
FVC-POST: NORMAL
FVC-PRE: NORMAL
GAW %PRED: NORMAL
GAW PRED: NORMAL
GAW: NORMAL
IC %PRED: NORMAL
IC PRED: NORMAL
IC: NORMAL
MEP: NORMAL
MIP: NORMAL
MVV %PRED-PRE: NORMAL
MVV PRED: NORMAL
MVV-PRE: NORMAL
PEF %PRED-POST: NORMAL
PEF %PRED-PRE: NORMAL
PEF PRED: NORMAL
PEF%CHNG: NORMAL
PEF-POST: NORMAL
PEF-PRE: NORMAL
RAW %PRED: NORMAL
RAW PRED: NORMAL
RAW: NORMAL
RV %PRED: NORMAL
RV PRED: NORMAL
RV: NORMAL
SVC %PRED: NORMAL
SVC PRED: NORMAL
SVC: NORMAL
TLC %PRED: 75 %
TLC PRED: NORMAL
TLC: NORMAL
VA %PRED: NORMAL
VA PRED: NORMAL
VA: NORMAL
VTG %PRED: NORMAL
VTG PRED: NORMAL
VTG: NORMAL

## 2023-04-06 PROCEDURE — 94640 AIRWAY INHALATION TREATMENT: CPT

## 2023-04-06 PROCEDURE — 94729 DIFFUSING CAPACITY: CPT

## 2023-04-06 PROCEDURE — 94726 PLETHYSMOGRAPHY LUNG VOLUMES: CPT

## 2023-04-06 PROCEDURE — 94060 EVALUATION OF WHEEZING: CPT

## 2023-04-06 PROCEDURE — 6370000000 HC RX 637 (ALT 250 FOR IP): Performed by: INTERNAL MEDICINE

## 2023-04-06 RX ORDER — ALBUTEROL SULFATE 90 UG/1
4 AEROSOL, METERED RESPIRATORY (INHALATION) ONCE
Status: COMPLETED | OUTPATIENT
Start: 2023-04-06 | End: 2023-04-06

## 2023-04-06 RX ADMIN — ALBUTEROL SULFATE 4 PUFF: 90 AEROSOL, METERED RESPIRATORY (INHALATION) at 13:38

## 2023-04-06 ASSESSMENT — PULMONARY FUNCTION TESTS
FVC_PERCENT_PREDICTED_PRE: 81
FVC_PERCENT_PREDICTED_POST: 87
FEV1_PERCENT_PREDICTED_PRE: 87
FEV1_PERCENT_PREDICTED_POST: 92

## 2023-04-07 PROBLEM — R05.3 CHRONIC COUGH: Status: ACTIVE | Noted: 2023-04-07

## 2023-04-07 NOTE — PROCEDURES
Reason for PFT:  Chronic cough    Spirometry  Spirometry is normal.  There is no demonstrable obstructive defect. Lung Volumes  Lung volumes show mild restrictive defect. Bronchodilator  There is no response to bronchodilator demonstrated. [Increase in FEV1 and/or FVC => 12% of control and => 200 ml]    Flow-Volume Loop  The flow-volume loop is normal.    Diffusing Capacity  Diffusing capacity is elevated and may suggest polycythemia, pulmonary hemorrhage, shunt. Overall Interpretation  No obstruction is present    Mild restriction is present    There is not a bronchodilator response present    Diffusion capacity is elevated    FEV1 is 1.86 L at 87% predicted. FVC is 2.52 L at 81% predicted    FEV1/FVC ratio is 83    Mild restriction which is likely related to obesity. Fairly elevated diffusing capacity which may be related to obesity, shunting, pulmonary hemorrhage etc.  No obstruction. Correlate clinically         OBSTRUCTION % Predicted FEV1   MILD >70%   MODERATE 60-69%   MODERATELY-SEVERE 50-59%   SEVERE 35-49%   VERY SEVERE <35%         RESTRICTION % Predicted TLC   MILD Less than LLN but > than 70%   MODERATE 60-69%   MODERATELY-SEVERE <60%                 DIFFUSION CAPACITY DL,CO % Pred   MILD >60% AND < LLN   MODERATE 40-60%   SEVERE <40%       PFT data will be scanned into the media tab in epic.     K[facility] [address] [facility] Pulmonology

## 2023-04-20 ENCOUNTER — OFFICE VISIT (OUTPATIENT)
Dept: FAMILY MEDICINE CLINIC | Age: 73
End: 2023-04-20

## 2023-04-20 VITALS — HEIGHT: 65 IN | BODY MASS INDEX: 37.19 KG/M2 | WEIGHT: 223.2 LBS

## 2023-04-20 DIAGNOSIS — R10.11 RIGHT UPPER QUADRANT ABDOMINAL PAIN: Primary | ICD-10-CM

## 2023-04-20 LAB
ALBUMIN SERPL-MCNC: 4.2 G/DL (ref 3.4–5)
ALBUMIN/GLOB SERPL: 2 {RATIO} (ref 1.1–2.2)
ALP SERPL-CCNC: 88 U/L (ref 40–129)
ALT SERPL-CCNC: 27 U/L (ref 10–40)
ANION GAP SERPL CALCULATED.3IONS-SCNC: 11 MMOL/L (ref 3–16)
AST SERPL-CCNC: 32 U/L (ref 15–37)
BILIRUB SERPL-MCNC: 0.4 MG/DL (ref 0–1)
BUN SERPL-MCNC: 8 MG/DL (ref 7–20)
CALCIUM SERPL-MCNC: 9.3 MG/DL (ref 8.3–10.6)
CHLORIDE SERPL-SCNC: 108 MMOL/L (ref 99–110)
CO2 SERPL-SCNC: 26 MMOL/L (ref 21–32)
CREAT SERPL-MCNC: 0.8 MG/DL (ref 0.6–1.2)
GFR SERPLBLD CREATININE-BSD FMLA CKD-EPI: >60 ML/MIN/{1.73_M2}
GLUCOSE SERPL-MCNC: 117 MG/DL (ref 70–99)
LIPASE SERPL-CCNC: 18 U/L (ref 13–60)
POTASSIUM SERPL-SCNC: 4.3 MMOL/L (ref 3.5–5.1)
PROT SERPL-MCNC: 6.3 G/DL (ref 6.4–8.2)
SODIUM SERPL-SCNC: 145 MMOL/L (ref 136–145)

## 2023-04-20 SDOH — ECONOMIC STABILITY: INCOME INSECURITY: HOW HARD IS IT FOR YOU TO PAY FOR THE VERY BASICS LIKE FOOD, HOUSING, MEDICAL CARE, AND HEATING?: NOT HARD AT ALL

## 2023-04-20 SDOH — ECONOMIC STABILITY: FOOD INSECURITY: WITHIN THE PAST 12 MONTHS, YOU WORRIED THAT YOUR FOOD WOULD RUN OUT BEFORE YOU GOT MONEY TO BUY MORE.: NEVER TRUE

## 2023-04-20 SDOH — ECONOMIC STABILITY: HOUSING INSECURITY
IN THE LAST 12 MONTHS, WAS THERE A TIME WHEN YOU DID NOT HAVE A STEADY PLACE TO SLEEP OR SLEPT IN A SHELTER (INCLUDING NOW)?: NO

## 2023-04-20 SDOH — ECONOMIC STABILITY: FOOD INSECURITY: WITHIN THE PAST 12 MONTHS, THE FOOD YOU BOUGHT JUST DIDN'T LAST AND YOU DIDN'T HAVE MONEY TO GET MORE.: NEVER TRUE

## 2023-04-20 ASSESSMENT — PATIENT HEALTH QUESTIONNAIRE - PHQ9
2. FEELING DOWN, DEPRESSED OR HOPELESS: 0
SUM OF ALL RESPONSES TO PHQ QUESTIONS 1-9: 0
SUM OF ALL RESPONSES TO PHQ QUESTIONS 1-9: 0
SUM OF ALL RESPONSES TO PHQ9 QUESTIONS 1 & 2: 0
1. LITTLE INTEREST OR PLEASURE IN DOING THINGS: 0
SUM OF ALL RESPONSES TO PHQ QUESTIONS 1-9: 0
SUM OF ALL RESPONSES TO PHQ QUESTIONS 1-9: 0

## 2023-04-20 ASSESSMENT — ENCOUNTER SYMPTOMS
ABDOMINAL PAIN: 1
ABDOMINAL DISTENTION: 1

## 2023-04-20 ASSESSMENT — LIFESTYLE VARIABLES
HOW MANY STANDARD DRINKS CONTAINING ALCOHOL DO YOU HAVE ON A TYPICAL DAY: 1 OR 2
HOW OFTEN DO YOU HAVE A DRINK CONTAINING ALCOHOL: NEVER

## 2023-04-21 ENCOUNTER — TELEPHONE (OUTPATIENT)
Dept: FAMILY MEDICINE CLINIC | Age: 73
End: 2023-04-21

## 2023-04-26 ENCOUNTER — HOSPITAL ENCOUNTER (OUTPATIENT)
Dept: CT IMAGING | Age: 73
Discharge: HOME | End: 2023-04-26
Payer: MEDICARE

## 2023-04-26 DIAGNOSIS — R10.11 RIGHT UPPER QUADRANT ABDOMINAL PAIN: ICD-10-CM

## 2023-04-26 PROCEDURE — 6360000004 HC RX CONTRAST MEDICATION: Performed by: FAMILY MEDICINE

## 2023-04-26 PROCEDURE — 74177 CT ABD & PELVIS W/CONTRAST: CPT

## 2023-04-26 RX ADMIN — IOHEXOL 50 ML: 240 INJECTION, SOLUTION INTRATHECAL; INTRAVASCULAR; INTRAVENOUS; ORAL at 10:25

## 2023-04-28 ENCOUNTER — TELEPHONE (OUTPATIENT)
Dept: FAMILY MEDICINE CLINIC | Age: 73
End: 2023-04-28

## 2023-04-28 RX ORDER — DICLOFENAC SODIUM 75 MG/1
75 TABLET, DELAYED RELEASE ORAL 2 TIMES DAILY
Qty: 30 TABLET | Refills: 1 | Status: SHIPPED | OUTPATIENT
Start: 2023-04-28 | End: 2023-05-17

## 2023-04-28 NOTE — TELEPHONE ENCOUNTER
Patient called requesting to speak to someone in Dr Betito Caldera office. Offered to send a message, patient stated she would rather wait on hold until she could speak to someone. Explained staff were all in with patients. Patient agreed to send message but stated she wants a return call today regarding her CT results.  Patient states she does not want to go the entire weekend with the pain in her side

## 2023-04-28 NOTE — TELEPHONE ENCOUNTER
I sent an anti inflammatory medication in for her to try to see if this helps with inflammation in abd wall

## 2023-04-28 NOTE — TELEPHONE ENCOUNTER
Spoke to pt, informed her of CT result message  She stated she feels the same still having pain in abdomin area,  she would like to know so what can be done to give her relief?   Please advise

## 2023-05-08 ENCOUNTER — TELEPHONE (OUTPATIENT)
Dept: FAMILY MEDICINE CLINIC | Age: 73
End: 2023-05-08

## 2023-05-08 NOTE — TELEPHONE ENCOUNTER
Spoke to pt, pt declining PT at this time, pt doesn't think this will help. Pt does have outstanding labs  ordered 4/28/23.  I advised pt to schedule to have these drawn, pt scheduled for 5/10/23

## 2023-05-08 NOTE — TELEPHONE ENCOUNTER
Patient called stating the anti-inflammatory is not working for inflammation of abdominal wall.  Patient states she is still having pain and inquiring what the next step is

## 2023-05-10 ENCOUNTER — NURSE ONLY (OUTPATIENT)
Dept: FAMILY MEDICINE CLINIC | Age: 73
End: 2023-05-10
Payer: MEDICARE

## 2023-05-10 DIAGNOSIS — E78.5 HYPERLIPIDEMIA, UNSPECIFIED HYPERLIPIDEMIA TYPE: ICD-10-CM

## 2023-05-10 DIAGNOSIS — R39.9 UTI SYMPTOMS: ICD-10-CM

## 2023-05-10 DIAGNOSIS — E55.9 VITAMIN D DEFICIENCY: ICD-10-CM

## 2023-05-10 DIAGNOSIS — I10 HYPERTENSION, UNSPECIFIED TYPE: ICD-10-CM

## 2023-05-10 DIAGNOSIS — R53.83 FATIGUE, UNSPECIFIED TYPE: ICD-10-CM

## 2023-05-10 LAB
25(OH)D3 SERPL-MCNC: 46.4 NG/ML
ALBUMIN SERPL-MCNC: 4.6 G/DL (ref 3.4–5)
ALBUMIN/GLOB SERPL: 2.1 {RATIO} (ref 1.1–2.2)
ALP SERPL-CCNC: 88 U/L (ref 40–129)
ALT SERPL-CCNC: 36 U/L (ref 10–40)
ANION GAP SERPL CALCULATED.3IONS-SCNC: 10 MMOL/L (ref 3–16)
AST SERPL-CCNC: 26 U/L (ref 15–37)
BASOPHILS # BLD: 0 K/UL (ref 0–0.2)
BASOPHILS NFR BLD: 0.6 %
BILIRUB SERPL-MCNC: 0.5 MG/DL (ref 0–1)
BILIRUBIN, POC: NORMAL
BLOOD URINE, POC: 5
BUN SERPL-MCNC: 11 MG/DL (ref 7–20)
CALCIUM SERPL-MCNC: 9.4 MG/DL (ref 8.3–10.6)
CHLORIDE SERPL-SCNC: 104 MMOL/L (ref 99–110)
CHOLEST SERPL-MCNC: 257 MG/DL (ref 0–199)
CLARITY, POC: CLEAR
CO2 SERPL-SCNC: 26 MMOL/L (ref 21–32)
COLOR, POC: YELLOW
CREAT SERPL-MCNC: 0.8 MG/DL (ref 0.6–1.2)
CRP SERPL-MCNC: 3.8 MG/L (ref 0–5.1)
DEPRECATED RDW RBC AUTO: 13.2 % (ref 12.4–15.4)
EOSINOPHIL # BLD: 0.2 K/UL (ref 0–0.6)
EOSINOPHIL NFR BLD: 2.7 %
FERRITIN SERPL IA-MCNC: 158.7 NG/ML (ref 15–150)
GFR SERPLBLD CREATININE-BSD FMLA CKD-EPI: >60 ML/MIN/{1.73_M2}
GLUCOSE P FAST SERPL-MCNC: 101 MG/DL (ref 70–99)
GLUCOSE URINE, POC: NORMAL
HCT VFR BLD AUTO: 46.7 % (ref 36–48)
HDLC SERPL-MCNC: 62 MG/DL (ref 40–60)
HGB BLD-MCNC: 15.1 G/DL (ref 12–16)
IRON SERPL-MCNC: 100 UG/DL (ref 37–145)
KETONES, POC: >=1.03
LDL CHOLESTEROL CALCULATED: 166 MG/DL
LEUKOCYTE EST, POC: NORMAL
LYMPHOCYTES # BLD: 2.2 K/UL (ref 1–5.1)
LYMPHOCYTES NFR BLD: 34.4 %
MAGNESIUM SERPL-MCNC: 1.9 MG/DL (ref 1.8–2.4)
MCH RBC QN AUTO: 29.8 PG (ref 26–34)
MCHC RBC AUTO-ENTMCNC: 32.5 G/DL (ref 31–36)
MCV RBC AUTO: 91.7 FL (ref 80–100)
MONOCYTES # BLD: 0.5 K/UL (ref 0–1.3)
MONOCYTES NFR BLD: 7.8 %
NEUTROPHILS # BLD: 3.5 K/UL (ref 1.7–7.7)
NEUTROPHILS NFR BLD: 54.5 %
NITRITE, POC: NORMAL
PH, POC: NORMAL
PHOSPHATE SERPL-MCNC: 3.4 MG/DL (ref 2.5–4.9)
PLATELET # BLD AUTO: 218 K/UL (ref 135–450)
PMV BLD AUTO: 10.3 FL (ref 5–10.5)
POTASSIUM SERPL-SCNC: 4.7 MMOL/L (ref 3.5–5.1)
PROT SERPL-MCNC: 6.8 G/DL (ref 6.4–8.2)
PROTEIN, POC: 0.2
RBC # BLD AUTO: 5.09 M/UL (ref 4–5.2)
SODIUM SERPL-SCNC: 140 MMOL/L (ref 136–145)
SPECIFIC GRAVITY, POC: NORMAL
TRIGL SERPL-MCNC: 143 MG/DL (ref 0–150)
UROBILINOGEN, POC: NORMAL
VLDLC SERPL CALC-MCNC: 29 MG/DL
WBC # BLD AUTO: 6.4 K/UL (ref 4–11)

## 2023-05-10 PROCEDURE — 36415 COLL VENOUS BLD VENIPUNCTURE: CPT | Performed by: FAMILY MEDICINE

## 2023-05-10 PROCEDURE — 81002 URINALYSIS NONAUTO W/O SCOPE: CPT | Performed by: FAMILY MEDICINE

## 2023-05-10 NOTE — PROGRESS NOTES
Pt in for labs, ua and urine cx     2 SST 1 LAV collected. UA performed, CX was sent     Pt tolerated well.

## 2023-05-11 ENCOUNTER — OFFICE VISIT (OUTPATIENT)
Dept: FAMILY MEDICINE CLINIC | Age: 73
End: 2023-05-11

## 2023-05-11 VITALS
BODY MASS INDEX: 36.82 KG/M2 | HEIGHT: 65 IN | DIASTOLIC BLOOD PRESSURE: 80 MMHG | WEIGHT: 221 LBS | SYSTOLIC BLOOD PRESSURE: 138 MMHG

## 2023-05-11 DIAGNOSIS — R10.31 RIGHT LOWER QUADRANT PAIN: ICD-10-CM

## 2023-05-11 DIAGNOSIS — M46.1 SACROILIAC INFLAMMATION (HCC): ICD-10-CM

## 2023-05-11 DIAGNOSIS — S39.011S STRAIN OF ABDOMINAL WALL, SEQUELA: Primary | ICD-10-CM

## 2023-05-11 LAB
BACTERIA UR CULT: ABNORMAL
ORGANISM: ABNORMAL

## 2023-05-11 RX ORDER — METHYLPREDNISOLONE 4 MG/1
TABLET ORAL
Qty: 1 KIT | Refills: 0 | Status: SHIPPED | OUTPATIENT
Start: 2023-05-11 | End: 2023-05-17

## 2023-05-12 RX ORDER — CIPROFLOXACIN 500 MG/1
500 TABLET, FILM COATED ORAL 2 TIMES DAILY
Qty: 10 TABLET | Refills: 0 | Status: SHIPPED | OUTPATIENT
Start: 2023-05-12 | End: 2023-05-17

## 2023-05-14 ASSESSMENT — ENCOUNTER SYMPTOMS: ABDOMINAL PAIN: 1

## 2023-05-15 ENCOUNTER — INITIAL CONSULT (OUTPATIENT)
Dept: SURGERY | Age: 73
End: 2023-05-15
Payer: MEDICARE

## 2023-05-15 VITALS — BODY MASS INDEX: 36.78 KG/M2 | WEIGHT: 221 LBS

## 2023-05-15 DIAGNOSIS — D17.1 LIPOMA OF TORSO: Primary | ICD-10-CM

## 2023-05-15 PROCEDURE — 1123F ACP DISCUSS/DSCN MKR DOCD: CPT | Performed by: SURGERY

## 2023-05-15 PROCEDURE — 99203 OFFICE O/P NEW LOW 30 MIN: CPT | Performed by: SURGERY

## 2023-05-15 ASSESSMENT — ENCOUNTER SYMPTOMS: ABDOMINAL PAIN: 1

## 2023-05-15 NOTE — PROGRESS NOTES
jimy Butler (:  1950) is a 75 y.o. female,New patient, here for evaluation of the following chief complaint(s):  New Patient (Ref by Karyle Bulla for RLQ pain. Pt states the pain has been going on for a few months, CT scan showed normal. )         ASSESSMENT/PLAN:  1. Lipoma of torso    Removal in OR    The risks, benefits and alternatives to the planned procedure were discussed. Patient expressed an understanding and is willing to proceed. Subjective   SUBJECTIVE/OBJECTIVE:  HPI  Chief Complaint: abdominal pain    Patient referred by Dr. Corey Palomo for evaluation of abdominal pain. Patient reports symptoms of pain with exertion or certain postures. Location of symptoms is RUQ from the costal margin to her groin. Symptoms were first noted a few months ago. Previous evaluation includes PCP exam and CT which was negative for pathology. Patient has a history of cholecystectomy and hysterectomy. On exam she has a 3 cm lipoma in the RUQ which is tender to palpation. Unclear if this is the cause of her entire situation but clearly contributing at least some of her pain. Will plan following treatment: removal of lipoma with ongoing workup if pain persists.       Past Medical History:   Diagnosis Date    Cancer [facility])     breast       Past Surgical History:   Procedure Laterality Date    CHOLECYSTECTOMY      FINGER SURGERY      HYSTERECTOMY (CERVIX STATUS UNKNOWN)      MASTECTOMY, BILATERAL Bilateral 2012     BILATERAL SIMPLE MASTECTOMIES WITH FROZEN SECTION    OTHER SURGICAL HISTORY      removal blood clot above knee subsequent to injury    OTHER SURGICAL HISTORY  10/23/2012    Removal soft tissue lateral and medial aspests of bilateral mastetomy sites    TONSILLECTOMY         Current Outpatient Medications   Medication Sig Dispense Refill    ciprofloxacin (CIPRO) 500 MG tablet Take 1 tablet by mouth 2 times daily for 5 days 10 tablet 0    methylPREDNISolone (MEDROL DOSEPACK) 4 MG tablet Take by

## 2023-05-17 RX ORDER — VIT C/B6/B5/MAGNESIUM/HERB 173 50-5-6-5MG
500 CAPSULE ORAL DAILY
COMMUNITY

## 2023-05-18 ENCOUNTER — ANESTHESIA EVENT (OUTPATIENT)
Dept: OPERATING ROOM | Age: 73
End: 2023-05-18
Payer: MEDICARE

## 2023-05-19 ENCOUNTER — HOSPITAL ENCOUNTER (OUTPATIENT)
Age: 73
Setting detail: OUTPATIENT SURGERY
Discharge: HOME | End: 2023-05-19
Attending: SURGERY | Admitting: SURGERY
Payer: MEDICARE

## 2023-05-19 ENCOUNTER — ANESTHESIA (OUTPATIENT)
Dept: OPERATING ROOM | Age: 73
End: 2023-05-19
Payer: MEDICARE

## 2023-05-19 VITALS
HEART RATE: 65 BPM | OXYGEN SATURATION: 98 % | BODY MASS INDEX: 35.2 KG/M2 | TEMPERATURE: 97 F | RESPIRATION RATE: 15 BRPM | DIASTOLIC BLOOD PRESSURE: 68 MMHG | HEIGHT: 66 IN | WEIGHT: 219.03 LBS | SYSTOLIC BLOOD PRESSURE: 115 MMHG

## 2023-05-19 DIAGNOSIS — D17.1 LIPOMA OF TORSO: ICD-10-CM

## 2023-05-19 PROCEDURE — 3600000002 HC SURGERY LEVEL 2 BASE: Performed by: SURGERY

## 2023-05-19 PROCEDURE — 3700000000 HC ANESTHESIA ATTENDED CARE: Performed by: SURGERY

## 2023-05-19 PROCEDURE — 2500000003 HC RX 250 WO HCPCS: Performed by: SURGERY

## 2023-05-19 PROCEDURE — 7100000001 HC PACU RECOVERY - ADDTL 15 MIN: Performed by: SURGERY

## 2023-05-19 PROCEDURE — 3700000001 HC ADD 15 MINUTES (ANESTHESIA): Performed by: SURGERY

## 2023-05-19 PROCEDURE — 2580000003 HC RX 258

## 2023-05-19 PROCEDURE — 22903 EXC ABD LES SC 3 CM/>: CPT | Performed by: SURGERY

## 2023-05-19 PROCEDURE — 6360000002 HC RX W HCPCS: Performed by: ANESTHESIOLOGY

## 2023-05-19 PROCEDURE — 2580000003 HC RX 258: Performed by: SURGERY

## 2023-05-19 PROCEDURE — 2500000003 HC RX 250 WO HCPCS: Performed by: ANESTHESIOLOGY

## 2023-05-19 PROCEDURE — A4217 STERILE WATER/SALINE, 500 ML: HCPCS | Performed by: SURGERY

## 2023-05-19 PROCEDURE — 88304 TISSUE EXAM BY PATHOLOGIST: CPT

## 2023-05-19 PROCEDURE — 7100000010 HC PHASE II RECOVERY - FIRST 15 MIN: Performed by: SURGERY

## 2023-05-19 PROCEDURE — 2709999900 HC NON-CHARGEABLE SUPPLY: Performed by: SURGERY

## 2023-05-19 PROCEDURE — 7100000000 HC PACU RECOVERY - FIRST 15 MIN: Performed by: SURGERY

## 2023-05-19 PROCEDURE — 3600000012 HC SURGERY LEVEL 2 ADDTL 15MIN: Performed by: SURGERY

## 2023-05-19 PROCEDURE — 7100000011 HC PHASE II RECOVERY - ADDTL 15 MIN: Performed by: SURGERY

## 2023-05-19 RX ORDER — SODIUM CHLORIDE 0.9 % (FLUSH) 0.9 %
5-40 SYRINGE (ML) INJECTION PRN
Status: DISCONTINUED | OUTPATIENT
Start: 2023-05-19 | End: 2023-05-19 | Stop reason: HOSPADM

## 2023-05-19 RX ORDER — PROPOFOL 10 MG/ML
INJECTION, EMULSION INTRAVENOUS CONTINUOUS PRN
Status: DISCONTINUED | OUTPATIENT
Start: 2023-05-19 | End: 2023-05-19 | Stop reason: SDUPTHER

## 2023-05-19 RX ORDER — DROPERIDOL 2.5 MG/ML
0.62 INJECTION, SOLUTION INTRAMUSCULAR; INTRAVENOUS
Status: DISCONTINUED | OUTPATIENT
Start: 2023-05-19 | End: 2023-05-19 | Stop reason: HOSPADM

## 2023-05-19 RX ORDER — MAGNESIUM HYDROXIDE 1200 MG/15ML
LIQUID ORAL CONTINUOUS PRN
Status: COMPLETED | OUTPATIENT
Start: 2023-05-19 | End: 2023-05-19

## 2023-05-19 RX ORDER — PROPOFOL 10 MG/ML
INJECTION, EMULSION INTRAVENOUS PRN
Status: DISCONTINUED | OUTPATIENT
Start: 2023-05-19 | End: 2023-05-19 | Stop reason: SDUPTHER

## 2023-05-19 RX ORDER — DEXAMETHASONE SODIUM PHOSPHATE 4 MG/ML
INJECTION, SOLUTION INTRA-ARTICULAR; INTRALESIONAL; INTRAMUSCULAR; INTRAVENOUS; SOFT TISSUE PRN
Status: DISCONTINUED | OUTPATIENT
Start: 2023-05-19 | End: 2023-05-19 | Stop reason: SDUPTHER

## 2023-05-19 RX ORDER — SODIUM CHLORIDE 9 MG/ML
INJECTION, SOLUTION INTRAVENOUS PRN
Status: DISCONTINUED | OUTPATIENT
Start: 2023-05-19 | End: 2023-05-19 | Stop reason: HOSPADM

## 2023-05-19 RX ORDER — SODIUM CHLORIDE 0.9 % (FLUSH) 0.9 %
5-40 SYRINGE (ML) INJECTION EVERY 12 HOURS SCHEDULED
Status: DISCONTINUED | OUTPATIENT
Start: 2023-05-19 | End: 2023-05-19 | Stop reason: HOSPADM

## 2023-05-19 RX ORDER — CLINDAMYCIN PHOSPHATE 600 MG/50ML
600 INJECTION, SOLUTION INTRAVENOUS ONCE
Status: DISCONTINUED | OUTPATIENT
Start: 2023-05-19 | End: 2023-05-19 | Stop reason: DRUGHIGH

## 2023-05-19 RX ORDER — FENTANYL CITRATE 50 UG/ML
25 INJECTION, SOLUTION INTRAMUSCULAR; INTRAVENOUS EVERY 5 MIN PRN
Status: DISCONTINUED | OUTPATIENT
Start: 2023-05-19 | End: 2023-05-19 | Stop reason: HOSPADM

## 2023-05-19 RX ORDER — TRAMADOL HYDROCHLORIDE 50 MG/1
50 TABLET ORAL EVERY 6 HOURS PRN
Qty: 12 TABLET | Refills: 0 | Status: SHIPPED | OUTPATIENT
Start: 2023-05-19 | End: 2023-05-22

## 2023-05-19 RX ORDER — CLINDAMYCIN PHOSPHATE 900 MG/50ML
900 INJECTION INTRAVENOUS ONCE
Status: COMPLETED | OUTPATIENT
Start: 2023-05-19 | End: 2023-05-19

## 2023-05-19 RX ORDER — ONDANSETRON 2 MG/ML
4 INJECTION INTRAMUSCULAR; INTRAVENOUS
Status: DISCONTINUED | OUTPATIENT
Start: 2023-05-19 | End: 2023-05-19 | Stop reason: HOSPADM

## 2023-05-19 RX ORDER — SODIUM CHLORIDE 9 MG/ML
INJECTION, SOLUTION INTRAVENOUS CONTINUOUS PRN
Status: DISCONTINUED | OUTPATIENT
Start: 2023-05-19 | End: 2023-05-19 | Stop reason: SDUPTHER

## 2023-05-19 RX ORDER — FENTANYL CITRATE 50 UG/ML
INJECTION, SOLUTION INTRAMUSCULAR; INTRAVENOUS PRN
Status: DISCONTINUED | OUTPATIENT
Start: 2023-05-19 | End: 2023-05-19 | Stop reason: SDUPTHER

## 2023-05-19 RX ORDER — ONDANSETRON 2 MG/ML
INJECTION INTRAMUSCULAR; INTRAVENOUS PRN
Status: DISCONTINUED | OUTPATIENT
Start: 2023-05-19 | End: 2023-05-19 | Stop reason: SDUPTHER

## 2023-05-19 RX ORDER — PHENYLEPHRINE HCL IN 0.9% NACL 1 MG/10 ML
SYRINGE (ML) INTRAVENOUS PRN
Status: DISCONTINUED | OUTPATIENT
Start: 2023-05-19 | End: 2023-05-19 | Stop reason: SDUPTHER

## 2023-05-19 RX ORDER — LIDOCAINE HYDROCHLORIDE 20 MG/ML
INJECTION, SOLUTION EPIDURAL; INFILTRATION; INTRACAUDAL; PERINEURAL PRN
Status: DISCONTINUED | OUTPATIENT
Start: 2023-05-19 | End: 2023-05-19 | Stop reason: SDUPTHER

## 2023-05-19 RX ADMIN — FENTANYL CITRATE 25 MCG: 50 INJECTION INTRAMUSCULAR; INTRAVENOUS at 11:40

## 2023-05-19 RX ADMIN — Medication 100 MCG: at 11:55

## 2023-05-19 RX ADMIN — ONDANSETRON 4 MG: 2 INJECTION INTRAMUSCULAR; INTRAVENOUS at 12:01

## 2023-05-19 RX ADMIN — Medication 100 MCG: at 11:52

## 2023-05-19 RX ADMIN — Medication 100 MCG: at 11:47

## 2023-05-19 RX ADMIN — PROPOFOL 20 MG: 10 INJECTION, EMULSION INTRAVENOUS at 11:42

## 2023-05-19 RX ADMIN — LIDOCAINE HYDROCHLORIDE 100 MG: 20 INJECTION, SOLUTION EPIDURAL; INFILTRATION; INTRACAUDAL; PERINEURAL at 11:38

## 2023-05-19 RX ADMIN — CLINDAMYCIN PHOSPHATE 900 MG: 900 INJECTION, SOLUTION INTRAVENOUS at 11:45

## 2023-05-19 RX ADMIN — Medication 100 MCG: at 12:01

## 2023-05-19 RX ADMIN — SODIUM CHLORIDE: 9 INJECTION, SOLUTION INTRAVENOUS at 11:30

## 2023-05-19 RX ADMIN — Medication 100 MCG: at 11:49

## 2023-05-19 RX ADMIN — DEXAMETHASONE SODIUM PHOSPHATE 4 MG: 4 INJECTION, SOLUTION INTRAMUSCULAR; INTRAVENOUS at 11:50

## 2023-05-19 RX ADMIN — PROPOFOL 150 MCG/KG/MIN: 10 INJECTION, EMULSION INTRAVENOUS at 11:38

## 2023-05-19 RX ADMIN — PROPOFOL 50 MG: 10 INJECTION, EMULSION INTRAVENOUS at 11:38

## 2023-05-19 ASSESSMENT — PAIN - FUNCTIONAL ASSESSMENT: PAIN_FUNCTIONAL_ASSESSMENT: 0-10

## 2023-05-19 ASSESSMENT — PAIN SCALES - GENERAL: PAINLEVEL_OUTOF10: 0

## 2023-05-19 NOTE — BRIEF OP NOTE
Brief Postoperative Note      Patient: Ze Segovia  YOB: 1950  MRN: [id number]    Date of Procedure: 5/19/2023    Pre-Op Diagnosis Codes:     * Lipoma of torso [D17.1]    Post-Op Diagnosis: Same       Procedure(s):  EXCISION OF LIPOMA TORSO 4 cm, subcutaneous    Surgeon(s):  Sravanthi Burk MD    Assistant:  Surgical Assistant: Abby Silva    Anesthesia: Monitor Anesthesia Care    Estimated Blood Loss (mL): less than 50     Complications: None    Specimens:   ID Type Source Tests Collected by Time Destination   A : lipoma right torso Tissue Tissue SURGICAL PATHOLOGY Sravanthi Burk MD 5/19/2023 1148        Implants:  * No implants in log *      Drains: * No LDAs found *    Findings: no complications        Electronically signed by Catracho Caldera MD on 5/19/2023 at 12:01 PM

## 2023-05-19 NOTE — PROGRESS NOTES
Lavon Quan on his way to transport Pt home, discharge instructions given to Pt and friend, all questions answered.
Pt arrived to PACU from OR. Pt asleep on 2l/nc with oral airway in place. Abd soft. Dressing C/D/I.
Pt awake. Oral airway in place.
Pt is alert and oriented and denies pain at this time, medication delivered to bedside. Discharged to home with Andrea to transport.
Pt is alert and oriented and denies pain at this time, vital signs stable on room air. Tolerating a drink and a snack.
[facility] Pre-Admission Testing Electronic Communication Worksheet for OR/ENDO Procedures        Patient: Miriam Romero    DOS: 5/19/23    Arrival Time: 1000    Surgery Time:1125    Meds to Bed:  [x] YES    []  NO    Transportation Confirmed: [x] YES    []  NO    History and Physical:  [x] YES    []  NO  [] N/A  If yes, please list doctor or Urgent Care and date of H&P:     Additional Clearance(Cardiac, Pulmonary, etc):  [] YES    [x]  NO    Pre-Admission Testing Visit:  [] YES    [x]  NO If no, do labs/testing need to be done DOS?   [] YES    []  NO    Medication Reconciliation Complete:  [] YES    [x]  NO        Additional Notes:    Pt will call back to review medications and confirm ride for DOS          Interview Complete: [x] YES    []  NO          Pablo Garcia RN  11:02 AM
note these are generalized instructions for all surgical cases, you may be provided with more specific instructions according to your surgery.

## 2023-05-19 NOTE — DISCHARGE INSTRUCTIONS
Follow up in 2-3 weeks  Call [phone] for an appointment  Remove dressings in 3-4 days  Ok to shower in AM  No Driving for today. OK to drive starting 3/08 if you are not taking any pain medication.

## 2023-05-19 NOTE — H&P
Preoperative History and Physical Update    H&P from 5/15/2023 was reviewed    No changes noted today    PE  Alert and oriented  Lipoma RUQ, 3 cm    A/P  Lipoma RUQ  For removal today    The risks, benefits and alternatives to the planned procedure were discussed. Patient expressed an understanding and is willing to proceed.     Electronically signed by Betty Borges MD on 5/19/2023 at 11:21 AM

## 2023-05-19 NOTE — ANESTHESIA POSTPROCEDURE EVALUATION
Department of Anesthesiology  Postprocedure Note    Patient: Shana Price  MRN: [id number]  YOB: 1950  Date of evaluation: 5/19/2023      Procedure Summary     Date: 05/19/23 Room / Location: [address]    Anesthesia Start: 1134 Anesthesia Stop: 2435    Procedure: EXCISION OF LIPOMA TORSO Diagnosis:       Lipoma of torso      (LIPOMA OF TORSO)    Surgeons: Alessandro Travis MD Responsible Provider: Donald Montes MD    Anesthesia Type: MAC ASA Status: 2          Anesthesia Type: No value filed.     Kayla Phase I: Kayla Score: 10    Kayla Phase II: Kayla Score: 10      Anesthesia Post Evaluation    Patient location during evaluation: PACU  Patient participation: complete - patient participated  Level of consciousness: awake and alert  Airway patency: patent  Nausea & Vomiting: no nausea and no vomiting  Complications: no  Cardiovascular status: hemodynamically stable  Respiratory status: acceptable  Hydration status: stable

## 2023-05-19 NOTE — ANESTHESIA PRE PROCEDURE
Department of Anesthesiology  Preprocedure Note       Name:  Aristeo Ackerman   Age:  75 y.o.  :  1950                                          MRN:  [id number]         Date:  2023      Surgeon: Jasmine Saleem):  Radhika Louis MD    Procedure: Procedure(s):  EXCISION OF LIPOMA TORSO    Medications prior to admission:   Prior to Admission medications    Medication Sig Start Date End Date Taking? Authorizing Provider   turmeric 500 MG CAPS Take 1 capsule by mouth daily   Yes Historical Provider, MD   azelastine (ASTELIN) 0.1 % nasal spray 1 spray by Nasal route 2 times daily Use in each nostril as directed 23   Jose Montesinos MD   fluticasone (FLONASE) 50 MCG/ACT nasal spray 2 sprays by Each Nostril route daily 23   Jose Montesinos MD       Current medications:    Current Facility-Administered Medications   Medication Dose Route Frequency Provider Last Rate Last Admin    sodium chloride flush 0.9 % injection 5-40 mL  5-40 mL IntraVENous 2 times per day Nika Ch MD        sodium chloride flush 0.9 % injection 5-40 mL  5-40 mL IntraVENous PRN Nika Ch MD        0.9 % sodium chloride infusion   IntraVENous PRN Nika Ch MD        clindamycin (CLEOCIN) 900 mg in dextrose 5 % 50 mL IVPB  900 mg IntraVENous Once Radhika Louis MD           Allergies:     Allergies   Allergen Reactions    Bee Venom Swelling    Poison Ivy Extract     Morphine Nausea And Vomiting    Penicillins Rash       Problem List:    Patient Active Problem List   Diagnosis Code    Visit for suture removal Z48.02    H/O bilateral mastectomy Z90.13    Chronic cough R05.3       Past Medical History:        Diagnosis Date    Cancer ([address]     breast    PONV (postoperative nausea and vomiting)        Past Surgical History:        Procedure Laterality Date    CHOLECYSTECTOMY      FINGER SURGERY      HYSTERECTOMY (CERVIX STATUS UNKNOWN)      MASTECTOMY, BILATERAL Bilateral

## 2023-05-20 NOTE — OP NOTE
[address] [place] [address] [address]                                OPERATIVE REPORT    PATIENT NAME: Matthias Ludwig                   :        1950  MED REC NO:   [id number]                          ROOM:  ACCOUNT NO:   [de-identified]                           ADMIT DATE: 2023  PROVIDER:     Shannon Leong MD    DATE OF PROCEDURE:  2023    PREOPERATIVE DIAGNOSIS:  Lipoma, right upper quadrant abdominal wall. POSTOPERATIVE DIAGNOSIS:  Lipoma, right upper quadrant abdominal wall. PROCEDURE:  Removal of subcutaneous lipoma, right upper quadrant  measuring 4 cm. SURGEON:  Shannon Leong MD    SPECIMEN:  Lipoma. ESTIMATED BLOOD LOSS:  Less than 50 mL. COMPLICATIONS:  None. DISPOSITION:  To Recovery in stable condition. INDICATION:  The patient is a 34-year-old female with vague right-sided  abdominal and flank pain. This has been going on several months and she  was seen by her primary care doctor. A CT scan was obtained which did  not demonstrate any abnormality. She reports the pain is mostly with  movement or certain postures. On exam, she had a palpable lipoma which  was somewhat tender to palpation, but it was unclear if this was the  source of her pain. The risks, benefits, and alternatives of removal  were reviewed and she agreed to proceed. OPERATIVE PROCEDURE:  The patient was brought to the operating room,  placed supine, sedation delivered, and the right upper quadrant of the  abdomen prepped and draped in a sterile fashion. Local anesthetic was  infused. An oblique incision made and dissection carried into the  subcutaneous tissue were multiple lobulations of a lipoma were  encountered and removed. 4 cm of lipomatous tissue in total was  removed. Hemostasis was then achieved with cautery and the wound closed  with 3-0 and 4-0 Vicryl.   Dressings were applied and the

## 2023-05-30 NOTE — PROGRESS NOTES
8/22 NU OUTPATIENT PROGRESS NOTE  Date of Service:  1/27/2022  Address: [address] [place] [address] [address] [facility]First [address] [address]  Dept: [phone]  Loc: [phone]    Subjective:      Patient ID:  [id number]  Jazlyn Murray is a 66 y.o. female     HPI  bilat cataract  OD 2/15/22  Dr Harp [facility] 3/1/22  Review of Systems   Constitutional: Negative. Respiratory: Negative. Cardiovascular: Negative. Gastrointestinal: Negative. Skin: Negative. Neurological: Negative. Objective:   YOB: 1950    Date of Visit:  1/27/2022       Allergies   Allergen Reactions    Bee Venom Swelling    Poison Ivy Extract     Morphine Nausea And Vomiting    Penicillins Rash       Outpatient Medications Marked as Taking for the 1/27/22 encounter (Office Visit) with Ander Perdomo DO   Medication Sig Dispense Refill    predniSONE (DELTASONE) 5 MG tablet          Vitals:    01/27/22 1008   BP: 122/82   Pulse: 61   Temp: 97.1 °F (36.2 °C)   SpO2: 100%   Weight: 218 lb (98.9 kg)   Height: 5' 5\" (1.651 m)     Body mass index is 36.28 kg/m². Wt Readings from Last 3 Encounters:   01/27/22 218 lb (98.9 kg)   08/10/21 222 lb (100.7 kg)   07/20/21 222 lb (100.7 kg)     BP Readings from Last 3 Encounters:   01/27/22 122/82   08/10/21 110/74   07/20/21 (!) 150/62     Allergies   Allergen Reactions    Bee Venom Swelling    Poison Ivy Extract     Morphine Nausea And Vomiting    Penicillins Rash     Current Outpatient Medications   Medication Sig Dispense Refill    predniSONE (DELTASONE) 5 MG tablet        No current facility-administered medications for this visit.      Past Medical History:   Diagnosis Date    Cancer [facility])     breast     Past Surgical History:   Procedure Laterality Date    BREAST SURGERY      annabel mastectomies    CHOLECYSTECTOMY      FINGER SURGERY      HYSTERECTOMY      MASTECTOMY, BILATERAL Bilateral 07/17/2012     BILATERAL SIMPLE MASTECTOMIES WITH FROZEN SECTION    MASTECTOMY, BILATERAL      OTHER SURGICAL HISTORY      removal blood clot above knee subsequent to injury    OTHER SURGICAL HISTORY  10/23/2012    Removal soft tissue lateral and medial aspests of bilateral mastetomy sites    TONSILLECTOMY       Social History     Tobacco Use    Smoking status: Never Smoker    Smokeless tobacco: Never Used    Tobacco comment: encouraged to never smoke   Substance Use Topics    Alcohol use: Yes     Comment: rare    Drug use: No     Family History   Problem Relation Age of Onset    High Cholesterol Mother     Diabetes Mother     High Blood Pressure Father     High Cholesterol Father     Cancer Brother         leukemia, remission    Cancer Maternal Aunt         x2 breast 1 colon     Some post op nausea   Physical Exam  Vitals and nursing note reviewed. Constitutional:       Appearance: She is well-developed. HENT:      Head: Normocephalic. Neck:      Thyroid: No thyromegaly. Cardiovascular:      Rate and Rhythm: Normal rate and regular rhythm. Heart sounds: Normal heart sounds. Pulmonary:      Effort: Pulmonary effort is normal.      Breath sounds: Normal breath sounds. Lymphadenopathy:      Cervical: No cervical adenopathy. Neurological:      Mental Status: She is alert and oriented to person, place, and time. Psychiatric:         Behavior: Behavior normal.         Thought Content: Thought content normal.         Judgment: Judgment normal.            Assessment/Plan       Assessment/plan;  Lloyd Cousin was seen today for pre-op exam and cataract.     Diagnoses and all orders for this visit:    Preop examination  Pt medically clear for surgery  Cataract of both eyes, unspecified cataract type    Well adult exam  -     Comprehensive Metabolic Panel  -     CBC  -     Lipid Panel  -     Iron  -     FERRITIN  -     Vitamin D 25 Hydroxy  -     C-REACTIVE PROTEIN  -     MAGNESIUM  - PHOSPHORUS  -     POCT Urinalysis no Micro  -     Culture, Urine    Vitamin D deficiency  -     Vitamin D 25 Hydroxy    Polyarthralgia  -     C-REACTIVE PROTEIN    Fatigue, unspecified type  -     Comprehensive Metabolic Panel  -     CBC  -     FERRITIN     NITZA ESQUIVEL DO

## 2023-06-01 ENCOUNTER — OFFICE VISIT (OUTPATIENT)
Dept: SURGERY | Age: 73
End: 2023-06-01

## 2023-06-01 DIAGNOSIS — D17.1 LIPOMA OF TORSO: Primary | ICD-10-CM

## 2023-06-01 PROCEDURE — 99024 POSTOP FOLLOW-UP VISIT: CPT | Performed by: SURGERY

## 2023-06-01 NOTE — PROGRESS NOTES
Damien Cole (:  1950) is a 75 y.o. female,Established patient, here for evaluation of the following chief complaint(s):  Post-Op Check (Pt is here today for a postop excision of lipoma. )         ASSESSMENT/PLAN:  1. Lipoma of torso    Follow up with me as needed           Subjective   SUBJECTIVE/OBJECTIVE:  HPI  Patient presents s/p lipoma removal. Patient is two weeks post op. Pain level is minor. Incision appearance: well healed. Post op complications: none. Pathology report reviewed with patient and showed lipoma. Follow up prn. Review of Systems       Objective   Physical Exam       Electronically signed by Mell Paulino MD on 2023 at 4:43 PM        An electronic signature was used to authenticate this note.     --Mell Paulino MD

## 2023-07-18 ENCOUNTER — OFFICE VISIT (OUTPATIENT)
Dept: PULMONOLOGY | Age: 73
End: 2023-07-18
Payer: MEDICARE

## 2023-07-18 VITALS
BODY MASS INDEX: 37.15 KG/M2 | RESPIRATION RATE: 18 BRPM | WEIGHT: 223 LBS | HEART RATE: 78 BPM | DIASTOLIC BLOOD PRESSURE: 70 MMHG | OXYGEN SATURATION: 95 % | SYSTOLIC BLOOD PRESSURE: 140 MMHG | HEIGHT: 65 IN | TEMPERATURE: 96.2 F

## 2023-07-18 DIAGNOSIS — R05.3 CHRONIC COUGH: Primary | ICD-10-CM

## 2023-07-18 DIAGNOSIS — J31.0 CHRONIC RHINITIS: ICD-10-CM

## 2023-07-18 PROCEDURE — 1124F ACP DISCUSS-NO DSCNMKR DOCD: CPT | Performed by: INTERNAL MEDICINE

## 2023-07-18 PROCEDURE — 99214 OFFICE O/P EST MOD 30 MIN: CPT | Performed by: INTERNAL MEDICINE

## 2023-07-18 RX ORDER — OMEPRAZOLE 40 MG/1
40 CAPSULE, DELAYED RELEASE ORAL
Qty: 30 CAPSULE | Refills: 0 | Status: SHIPPED | OUTPATIENT
Start: 2023-07-18

## 2023-07-18 NOTE — PROGRESS NOTES
40 MG delayed release capsule Take 1 capsule by mouth every morning (before breakfast) 30 capsule 0    turmeric 500 MG CAPS Take 1 capsule by mouth daily      azelastine (ASTELIN) 0.1 % nasal spray 1 spray by Nasal route 2 times daily Use in each nostril as directed 2 mL 3    fluticasone (FLONASE) 50 MCG/ACT nasal spray 2 sprays by Each Nostril route daily 2 each 5     No current facility-administered medications for this visit. Data Reviewed:   CBC and Renal reviewed  Last CBC  Lab Results   Component Value Date/Time    WBC 6.4 05/10/2023 08:24 PM    RBC 5.09 05/10/2023 08:24 PM    HGB 15.1 05/10/2023 08:24 PM    MCV 91.7 05/10/2023 08:24 PM     05/10/2023 08:24 PM     Last Renal  Lab Results   Component Value Date/Time     05/10/2023 09:23 PM    K 4.7 05/10/2023 09:23 PM     05/10/2023 09:23 PM    CO2 26 05/10/2023 09:23 PM    CO2 26 04/20/2023 10:36 AM    CO2 25 01/27/2022 10:44 AM    BUN 11 05/10/2023 09:23 PM    CREATININE 0.8 05/10/2023 09:23 PM    GLUCOSE 117 04/20/2023 10:36 AM    CALCIUM 9.4 05/10/2023 09:23 PM         Radiology Review:  Pertinent images / reports were reviewed as a part of this visit. CT Chest w/ contrast: No results found for this or any previous visit. CT Chest w/o contrast: No results found for this or any previous visit. CTPA: No results found for this or any previous visit. CXR PA/LAT: Results for orders placed during the hospital encounter of 10/17/22    XR CHEST (2 VW)    Narrative  EXAMINATION:  TWO XRAY VIEWS OF THE CHEST    10/17/2022 12:34 pm    COMPARISON:  None. HISTORY:  ORDERING SYSTEM PROVIDED HISTORY: Bronchitis  TECHNOLOGIST PROVIDED HISTORY:  Reason for Exam: Bronchitis    FINDINGS:  Medical devices: None. Mediastinum/Heart: The cardiomediastinal contours are normal.    Lungs: The lungs are clear. Pleura: No evidence of pleural effusion or pneumothorax.     Bones/Soft tissues: No acute abnormalities are

## 2023-08-21 ENCOUNTER — E-VISIT (OUTPATIENT)
Dept: FAMILY MEDICINE CLINIC | Age: 73
End: 2023-08-21
Payer: MEDICARE

## 2023-08-21 DIAGNOSIS — J01.90 ACUTE BACTERIAL SINUSITIS: Primary | ICD-10-CM

## 2023-08-21 DIAGNOSIS — B96.89 ACUTE BACTERIAL SINUSITIS: Primary | ICD-10-CM

## 2023-08-21 PROCEDURE — 99422 OL DIG E/M SVC 11-20 MIN: CPT | Performed by: FAMILY MEDICINE

## 2023-08-21 RX ORDER — AZITHROMYCIN 250 MG/1
250 TABLET, FILM COATED ORAL SEE ADMIN INSTRUCTIONS
Qty: 6 TABLET | Refills: 0 | Status: SHIPPED | OUTPATIENT
Start: 2023-08-21 | End: 2023-08-26

## 2023-08-21 ASSESSMENT — LIFESTYLE VARIABLES: SMOKING_STATUS: NO, I'VE NEVER SMOKED

## 2023-10-17 ENCOUNTER — PATIENT MESSAGE (OUTPATIENT)
Dept: FAMILY MEDICINE CLINIC | Age: 73
End: 2023-10-17

## 2023-10-17 NOTE — TELEPHONE ENCOUNTER
From: Kevin Cid  To: Dr. Lindsey Guzmanf: 10/17/2023 6:59 AM EDT  Subject: Positive covid test    Tested positive for covid yesterday home test. Going to [facility] at 10:00 to confirm. I weigh 220 and would like a prescription for ivermectin. Please send to [address] [facility] in [place]. Is there anything else I should be doing? Thanks for your help.

## 2023-10-27 ENCOUNTER — PATIENT MESSAGE (OUTPATIENT)
Dept: FAMILY MEDICINE CLINIC | Age: 73
End: 2023-10-27

## 2023-10-27 RX ORDER — AZITHROMYCIN 250 MG/1
250 TABLET, FILM COATED ORAL SEE ADMIN INSTRUCTIONS
Qty: 6 TABLET | Refills: 0 | Status: SHIPPED | OUTPATIENT
Start: 2023-10-27 | End: 2023-11-01

## 2023-10-27 NOTE — TELEPHONE ENCOUNTER
From: Padmini Mabry  To: Dr. Mitul Ramirez  Sent: 10/27/2023 12:10 PM EDT  Subject: Covid    Still having a lot of sinus drainage and very productive cough. Still very Fatigued. What else can we do?

## 2023-11-08 ENCOUNTER — OFFICE VISIT (OUTPATIENT)
Dept: FAMILY MEDICINE CLINIC | Age: 73
End: 2023-11-08

## 2023-11-08 VITALS
WEIGHT: 223 LBS | SYSTOLIC BLOOD PRESSURE: 124 MMHG | BODY MASS INDEX: 37.15 KG/M2 | DIASTOLIC BLOOD PRESSURE: 70 MMHG | HEIGHT: 65 IN

## 2023-11-08 DIAGNOSIS — J40 BRONCHITIS: Primary | ICD-10-CM

## 2023-11-08 RX ORDER — DOXYCYCLINE HYCLATE 100 MG
100 TABLET ORAL 2 TIMES DAILY
Qty: 20 TABLET | Refills: 0 | Status: SHIPPED | OUTPATIENT
Start: 2023-11-08 | End: 2023-11-18

## 2023-11-08 RX ORDER — METHYLPREDNISOLONE 4 MG/1
TABLET ORAL
Qty: 1 KIT | Refills: 0 | Status: SHIPPED | OUTPATIENT
Start: 2023-11-08 | End: 2023-11-14

## 2023-11-08 ASSESSMENT — ENCOUNTER SYMPTOMS
CHEST TIGHTNESS: 1
WHEEZING: 1
COUGH: 1

## 2023-12-26 ENCOUNTER — HOSPITAL ENCOUNTER (OUTPATIENT)
Dept: CT IMAGING | Age: 73
Discharge: HOME | End: 2023-12-26
Attending: FAMILY MEDICINE
Payer: MEDICARE

## 2023-12-26 DIAGNOSIS — R05.8 RECURRENT COUGH: ICD-10-CM

## 2023-12-26 PROCEDURE — 71250 CT THORAX DX C-: CPT

## 2024-01-09 ENCOUNTER — OFFICE VISIT (OUTPATIENT)
Dept: FAMILY MEDICINE CLINIC | Age: 74
End: 2024-01-09
Payer: MEDICARE

## 2024-01-09 VITALS — HEIGHT: 65 IN | BODY MASS INDEX: 38.05 KG/M2 | WEIGHT: 228.4 LBS

## 2024-01-09 DIAGNOSIS — Z00.00 MEDICARE ANNUAL WELLNESS VISIT, SUBSEQUENT: Primary | ICD-10-CM

## 2024-01-09 DIAGNOSIS — J40 BRONCHITIS: ICD-10-CM

## 2024-01-09 PROCEDURE — G0439 PPPS, SUBSEQ VISIT: HCPCS | Performed by: FAMILY MEDICINE

## 2024-01-09 PROCEDURE — 1124F ACP DISCUSS-NO DSCNMKR DOCD: CPT | Performed by: FAMILY MEDICINE

## 2024-01-09 RX ORDER — PREDNISONE 10 MG/1
10 TABLET ORAL DAILY
Qty: 18 TABLET | Refills: 0 | Status: SHIPPED | OUTPATIENT
Start: 2024-01-09 | End: 2025-01-08

## 2024-01-09 RX ORDER — SULFAMETHOXAZOLE AND TRIMETHOPRIM 800; 160 MG/1; MG/1
1 TABLET ORAL 2 TIMES DAILY
Qty: 20 TABLET | Refills: 0 | Status: SHIPPED | OUTPATIENT
Start: 2024-01-09 | End: 2024-01-19

## 2024-01-09 ASSESSMENT — PATIENT HEALTH QUESTIONNAIRE - PHQ9
SUM OF ALL RESPONSES TO PHQ QUESTIONS 1-9: 0
SUM OF ALL RESPONSES TO PHQ QUESTIONS 1-9: 0
1. LITTLE INTEREST OR PLEASURE IN DOING THINGS: 0
SUM OF ALL RESPONSES TO PHQ QUESTIONS 1-9: 0
SUM OF ALL RESPONSES TO PHQ QUESTIONS 1-9: 0
SUM OF ALL RESPONSES TO PHQ9 QUESTIONS 1 & 2: 0
2. FEELING DOWN, DEPRESSED OR HOPELESS: 0

## 2024-01-09 ASSESSMENT — LIFESTYLE VARIABLES
HOW OFTEN DO YOU HAVE A DRINK CONTAINING ALCOHOL: NEVER
HOW MANY STANDARD DRINKS CONTAINING ALCOHOL DO YOU HAVE ON A TYPICAL DAY: 1 OR 2

## 2024-01-09 NOTE — PROGRESS NOTES
Medicare Annual Wellness Visit    Yvette Moreno is here for Rib Pain (Right sided patient states \"been going on a long while\") and Cough (Ongoing/)    Assessment & Plan   Medicare annual wellness visit, subsequent  Recommendations for Preventive Services Due: see orders and patient instructions/AVS.  Recommended screening schedule for the next 5-10 years is provided to the patient in written form: see Patient Instructions/AVS.     Return if symptoms worsen or fail to improve.     Subjective   Awv  She is coughing   has been for several weeks   Felt like she got better but now back  Ct of her chest was normal  Stress caring for her mom  she is staying with her mom      Patient's complete Health Risk Assessment and screening values have been reviewed and are found in Flowsheets. The following problems were reviewed today and where indicated follow up appointments were made and/or referrals ordered.    Positive Risk Factor Screenings with Interventions:                Activity, Diet, and Weight:  On average, how many days per week do you engage in moderate to strenuous exercise (like a brisk walk)?: 0 days  On average, how many minutes do you engage in exercise at this level?: 0 min    Do you eat balanced/healthy meals regularly?: Yes    Body mass index is 38.01 kg/m². (!) Abnormal      Inactivity Interventions:  Encouraged to increase activity as tolerated  Obesity Interventions:  Discussed healthy diet and exercise                               Objective   Vitals:    01/09/24 1140   Weight: 103.6 kg (228 lb 6.4 oz)   Height: 1.651 m (5' 5\")      Body mass index is 38.01 kg/m².        Skin: warm and dry, no rash or erythema  Head: normocephalic and atraumatic  Eyes: pupils equal, round, and reactive to light, extraocular eye movements intact, conjunctivae normal  ENT: tympanic membrane, external ear and ear canal normal bilaterally, nose without deformity, nasal mucosa and turbinates normal without polyps  Neck:

## 2024-01-09 NOTE — PATIENT INSTRUCTIONS
by [employer]. If you have questions about a medical condition or this instruction, always ask your healthcare professional. [employer], [employer] disclaims any warranty or liability for your use of this information.      Personalized Preventive Plan for Yvette Moreno - 1/9/2024  Medicare offers a range of preventive health benefits. Some of the tests and screenings are paid in full while other may be subject to a deductible, co-insurance, and/or copay.    Some of these benefits include a comprehensive review of your medical history including lifestyle, illnesses that may run in your family, and various assessments and screenings as appropriate.    After reviewing your medical record and screening and assessments performed today your provider may have ordered immunizations, labs, imaging, and/or referrals for you.  A list of these orders (if applicable) as well as your Preventive Care list are included within your After Visit Summary for your review.    Other Preventive Recommendations:    A preventive eye exam performed by an eye specialist is recommended every 1-2 years to screen for glaucoma; cataracts, macular degeneration, and other eye disorders.  A preventive dental visit is recommended every 6 months.  Try to get at least 150 minutes of exercise per week or 10,000 steps per day on a pedometer .  Order or download the FREE \"Exercise & Physical Activity: Your Everyday Guide\" from [employer]. Call 1-191.195.7442 or search [employer] online.  You need 8467-1205 mg of calcium and 4273-4886 IU of vitamin D per day. It is possible to meet your calcium requirement with diet alone, but a vitamin D supplement is usually necessary to meet this goal.  When exposed to the sun, use a sunscreen that protects against both UVA and UVB radiation with an SPF of 30 or greater. Reapply every 2 to 3 hours or after sweating, drying off with a towel, or swimming.  Always wear a seat

## 2024-04-15 ENCOUNTER — PATIENT MESSAGE (OUTPATIENT)
Dept: FAMILY MEDICINE CLINIC | Age: 74
End: 2024-04-15

## 2024-04-15 DIAGNOSIS — J01.90 ACUTE BACTERIAL SINUSITIS: Primary | ICD-10-CM

## 2024-04-15 DIAGNOSIS — B96.89 ACUTE BACTERIAL SINUSITIS: Primary | ICD-10-CM

## 2024-04-15 RX ORDER — AZITHROMYCIN 250 MG/1
TABLET, FILM COATED ORAL
Qty: 6 TABLET | Refills: 0 | Status: SHIPPED | OUTPATIENT
Start: 2024-04-15 | End: 2024-04-25

## 2024-04-15 RX ORDER — METHYLPREDNISOLONE 4 MG/1
TABLET ORAL
Qty: 1 KIT | Refills: 0 | Status: SHIPPED | OUTPATIENT
Start: 2024-04-15 | End: 2024-04-21

## 2024-04-16 NOTE — TELEPHONE ENCOUNTER
From: Yvette Moreno  To: Dr. Camelia Barriga  Sent: 4/15/2024 5:00 PM EDT  Subject: Respiratory issues    Returned from a softball trip last night. Woke up today was sore throat headache. No fever, did a Covid test was negative. I have lots of congestion. The cough is back, phlegm is a yellowish green gray. Should I just treat with over-the-counter stuff or should I do the steroids and antibiotics again? Thanks for your help. Hope you are doing well. Thanks so much for your help. didn’t work today and not working tomorrow.

## 2024-04-23 SDOH — ECONOMIC STABILITY: FOOD INSECURITY: WITHIN THE PAST 12 MONTHS, THE FOOD YOU BOUGHT JUST DIDN'T LAST AND YOU DIDN'T HAVE MONEY TO GET MORE.: NEVER TRUE

## 2024-04-23 SDOH — ECONOMIC STABILITY: INCOME INSECURITY: HOW HARD IS IT FOR YOU TO PAY FOR THE VERY BASICS LIKE FOOD, HOUSING, MEDICAL CARE, AND HEATING?: NOT HARD AT ALL

## 2024-04-23 SDOH — ECONOMIC STABILITY: TRANSPORTATION INSECURITY
IN THE PAST 12 MONTHS, HAS LACK OF TRANSPORTATION KEPT YOU FROM MEETINGS, WORK, OR FROM GETTING THINGS NEEDED FOR DAILY LIVING?: NO

## 2024-04-23 SDOH — ECONOMIC STABILITY: FOOD INSECURITY: WITHIN THE PAST 12 MONTHS, YOU WORRIED THAT YOUR FOOD WOULD RUN OUT BEFORE YOU GOT MONEY TO BUY MORE.: NEVER TRUE

## 2024-04-24 ENCOUNTER — OFFICE VISIT (OUTPATIENT)
Dept: FAMILY MEDICINE CLINIC | Age: 74
End: 2024-04-24
Payer: MEDICARE

## 2024-04-24 VITALS
HEIGHT: 65 IN | WEIGHT: 218.6 LBS | SYSTOLIC BLOOD PRESSURE: 124 MMHG | DIASTOLIC BLOOD PRESSURE: 72 MMHG | BODY MASS INDEX: 36.42 KG/M2

## 2024-04-24 DIAGNOSIS — J18.9 PNEUMONIA OF LEFT LOWER LOBE DUE TO INFECTIOUS ORGANISM: Primary | ICD-10-CM

## 2024-04-24 PROCEDURE — 1124F ACP DISCUSS-NO DSCNMKR DOCD: CPT | Performed by: FAMILY MEDICINE

## 2024-04-24 PROCEDURE — 96372 THER/PROPH/DIAG INJ SC/IM: CPT | Performed by: FAMILY MEDICINE

## 2024-04-24 PROCEDURE — 99213 OFFICE O/P EST LOW 20 MIN: CPT | Performed by: FAMILY MEDICINE

## 2024-04-24 RX ORDER — LEVOFLOXACIN 500 MG/1
500 TABLET, FILM COATED ORAL DAILY
Qty: 10 TABLET | Refills: 0 | Status: SHIPPED | OUTPATIENT
Start: 2024-04-24 | End: 2024-05-04

## 2024-04-24 RX ORDER — PREDNISONE 10 MG/1
10 TABLET ORAL DAILY
Qty: 18 TABLET | Refills: 0 | Status: SHIPPED | OUTPATIENT
Start: 2024-04-24 | End: 2024-04-24 | Stop reason: SDUPTHER

## 2024-04-24 RX ORDER — BENZONATATE 100 MG/1
100 CAPSULE ORAL 3 TIMES DAILY PRN
Qty: 30 CAPSULE | Refills: 0 | Status: SHIPPED | OUTPATIENT
Start: 2024-04-24 | End: 2024-04-24 | Stop reason: SDUPTHER

## 2024-04-24 RX ORDER — LEVOFLOXACIN 500 MG/1
500 TABLET, FILM COATED ORAL DAILY
Qty: 10 TABLET | Refills: 0 | Status: SHIPPED | OUTPATIENT
Start: 2024-04-24 | End: 2024-04-24 | Stop reason: SDUPTHER

## 2024-04-24 RX ORDER — METHYLPREDNISOLONE ACETATE 80 MG/ML
80 INJECTION, SUSPENSION INTRA-ARTICULAR; INTRALESIONAL; INTRAMUSCULAR; SOFT TISSUE ONCE
Status: COMPLETED | OUTPATIENT
Start: 2024-04-24 | End: 2024-04-24

## 2024-04-24 RX ORDER — BENZONATATE 100 MG/1
100 CAPSULE ORAL 3 TIMES DAILY PRN
Qty: 30 CAPSULE | Refills: 0 | Status: SHIPPED | OUTPATIENT
Start: 2024-04-24 | End: 2024-05-04

## 2024-04-24 RX ORDER — PREDNISONE 10 MG/1
10 TABLET ORAL DAILY
Qty: 18 TABLET | Refills: 0 | Status: SHIPPED | OUTPATIENT
Start: 2024-04-24 | End: 2025-04-24

## 2024-04-24 RX ADMIN — METHYLPREDNISOLONE ACETATE 80 MG: 80 INJECTION, SUSPENSION INTRA-ARTICULAR; INTRALESIONAL; INTRAMUSCULAR; SOFT TISSUE at 15:43

## 2024-04-24 ASSESSMENT — ENCOUNTER SYMPTOMS
COUGH: 1
SHORTNESS OF BREATH: 1

## 2024-04-24 NOTE — PROGRESS NOTES
Yvette Moreno (:  1950) is a 74 y.o. female,Established patient, here for evaluation of the following chief complaint(s):  Respiratory Distress and Cough (Drainage )      Assessment & Plan   1. Pneumonia of left lower lobe due to infectious organism  -     benzonatate (TESSALON) 100 MG capsule; Take 1 capsule by mouth 3 times daily as needed for Cough, Disp-30 capsule, R-0Normal  -     levoFLOXacin (LEVAQUIN) 500 MG tablet; Take 1 tablet by mouth daily for 10 days, Disp-10 tablet, R-0Normal  -     predniSONE (DELTASONE) 10 MG tablet; Take 1 tablet by mouth daily 3 pills q days 1-3  2 pills q days 4-6 1 pill q days 7-9, Disp-18 tablet, R-0Normal  -     methylPREDNISolone acetate (DEPO-MEDROL) injection 80 mg; 80 mg, IntraMUSCular, ONCE, 1 dose, On 24 at 1600    If not improving in next few days call and let me know        Subjective   Cough  This is a new problem. The current episode started in the past 7 days. The problem has been gradually worsening. The problem occurs every few minutes. The cough is Productive of sputum. Associated symptoms include chills (hot flashes), headaches, myalgias, nasal congestion, postnasal drip and shortness of breath. She has tried OTC cough suppressant (finished zpack and medrol  not helping) for the symptoms. The treatment provided no relief. Her past medical history is significant for bronchitis and environmental allergies.       Review of Systems   Constitutional:  Positive for chills (hot flashes).   HENT:  Positive for postnasal drip.    Respiratory:  Positive for cough and shortness of breath.    Musculoskeletal:  Positive for myalgias.   Allergic/Immunologic: Positive for environmental allergies.   Neurological:  Positive for headaches.          Objective   Physical Exam  Constitutional:       General: She is not in acute distress.     Appearance: She is well-developed.   HENT:      Head: Normocephalic.      Right Ear: Tympanic membrane, ear canal and

## 2024-05-06 ENCOUNTER — PATIENT MESSAGE (OUTPATIENT)
Dept: FAMILY MEDICINE CLINIC | Age: 74
End: 2024-05-06

## 2024-05-06 RX ORDER — DOXYCYCLINE HYCLATE 100 MG
100 TABLET ORAL 2 TIMES DAILY
Qty: 20 TABLET | Refills: 0 | Status: SHIPPED | OUTPATIENT
Start: 2024-05-06 | End: 2024-05-16

## 2024-05-06 NOTE — TELEPHONE ENCOUNTER
From: Yvette Moreno  To: Dr. Camelia Barriga  Sent: 5/6/2024 11:45 AM EDT  Subject: Pneumonia treatment follow up    I finished steroids and antibiotics, but I still have a lot of congestion in my chest. Do I need more antibiotics or what else can we do to get rid of this? Thanks fir your help.

## 2024-06-14 ENCOUNTER — OFFICE VISIT (OUTPATIENT)
Dept: FAMILY MEDICINE CLINIC | Age: 74
End: 2024-06-14

## 2024-06-14 VITALS
HEIGHT: 65 IN | WEIGHT: 219.6 LBS | SYSTOLIC BLOOD PRESSURE: 154 MMHG | DIASTOLIC BLOOD PRESSURE: 100 MMHG | BODY MASS INDEX: 36.59 KG/M2

## 2024-06-14 DIAGNOSIS — S29.9XXA TRAUMATIC INJURY OF RIB: Primary | ICD-10-CM

## 2024-06-15 RX ORDER — TRAMADOL HYDROCHLORIDE 50 MG/1
50 TABLET ORAL EVERY 4 HOURS PRN
Qty: 56 TABLET | Refills: 0 | Status: SHIPPED | OUTPATIENT
Start: 2024-06-15 | End: 2024-06-29

## 2024-06-15 RX ORDER — DICLOFENAC SODIUM 75 MG/1
75 TABLET, DELAYED RELEASE ORAL 2 TIMES DAILY
Qty: 60 TABLET | Refills: 1 | Status: SHIPPED | OUTPATIENT
Start: 2024-06-15

## 2024-06-15 ASSESSMENT — ENCOUNTER SYMPTOMS
GASTROINTESTINAL NEGATIVE: 1
SHORTNESS OF BREATH: 1

## 2024-08-29 ENCOUNTER — OFFICE VISIT (OUTPATIENT)
Dept: FAMILY MEDICINE CLINIC | Age: 74
End: 2024-08-29

## 2024-08-29 VITALS
BODY MASS INDEX: 36.49 KG/M2 | SYSTOLIC BLOOD PRESSURE: 124 MMHG | WEIGHT: 219 LBS | HEIGHT: 65 IN | DIASTOLIC BLOOD PRESSURE: 70 MMHG

## 2024-08-29 DIAGNOSIS — R07.81 RIB PAIN ON RIGHT SIDE: ICD-10-CM

## 2024-08-29 DIAGNOSIS — R10.11 RIGHT UPPER QUADRANT PAIN: Primary | ICD-10-CM

## 2024-08-29 RX ORDER — MILK THISTLE 150 MG
500 CAPSULE ORAL DAILY
COMMUNITY

## 2024-08-29 ASSESSMENT — ENCOUNTER SYMPTOMS: ABDOMINAL PAIN: 1

## 2024-08-29 NOTE — PROGRESS NOTES
OUTPATIENT PROGRESS NOTE  Date of Service:  8/29/2024  Address: [facility]  [facility]  [address]  [address]  [facility] [address]  Dept: [phone]  Loc: [phone]    Subjective:      Patient ID:  [id number]  Yvette Moreno is a 74 y.o. female     Abdominal Pain    Chest Pain   Associated symptoms include abdominal pain.     Right upper quad pain and right lower rib pain continues  Hurts all the time  This has hurt for years   Had gallbladder out 20 years ago  Pain when she eats which is newer  Had ct x2   normal  Chest xray normal  Pain can be debilitating       Review of Systems   Constitutional:  Positive for activity change and fatigue.   Cardiovascular:  Positive for chest pain.   Gastrointestinal:  Positive for abdominal pain.       Objective:   YOB: 1950    Date of Visit:  8/29/2024       Allergies   Allergen Reactions    Bee Venom Swelling    Poison Ivy Extract     Morphine Nausea And Vomiting    Penicillins Rash       Outpatient Medications Marked as Taking for the 8/29/24 encounter (Office Visit) with Camelia Barriga DO   Medication Sig Dispense Refill    Berberine Chloride 500 MG CAPS Take 500 mg by mouth in the morning and at bedtime      Quercetin 500 MG CAPS Take 500 mg by mouth daily      Misc Natural Products (JOINT SUPPORT COMPLEX PO) Take 40 mg by mouth daily      diclofenac (VOLTAREN) 75 MG EC tablet Take 1 tablet by mouth 2 times daily 60 tablet 1       Vitals:    08/29/24 1111   BP: 124/70   Weight: 99.3 kg (219 lb)   Height: 1.651 m (5' 5\")     Body mass index is 36.44 kg/m².     Wt Readings from Last 3 Encounters:   08/29/24 99.3 kg (219 lb)   06/14/24 99.6 kg (219 lb 9.6 oz)   04/24/24 99.2 kg (218 lb 9.6 oz)     BP Readings from Last 3 Encounters:   08/29/24 124/70   06/14/24 (!) 154/100   04/24/24 124/72       Physical Exam  Constitutional:       Appearance: Normal appearance.   HENT:      Head: Normocephalic.

## 2024-09-16 ENCOUNTER — TELEPHONE (OUTPATIENT)
Dept: FAMILY MEDICINE CLINIC | Age: 74
End: 2024-09-16

## 2024-09-16 DIAGNOSIS — K58.1 IRRITABLE BOWEL SYNDROME WITH CONSTIPATION: Primary | ICD-10-CM

## 2024-09-16 RX ORDER — DICYCLOMINE HYDROCHLORIDE 10 MG/1
10 CAPSULE ORAL
Qty: 360 CAPSULE | Refills: 0 | Status: CANCELLED | OUTPATIENT
Start: 2024-09-16

## 2024-09-16 RX ORDER — DICYCLOMINE HCL 20 MG
TABLET ORAL
Qty: 2 TABLET | Refills: 0 | Status: SHIPPED | OUTPATIENT
Start: 2024-09-16

## 2024-09-19 ENCOUNTER — PATIENT MESSAGE (OUTPATIENT)
Dept: FAMILY MEDICINE CLINIC | Age: 74
End: 2024-09-19

## 2024-09-19 DIAGNOSIS — R10.30 LOWER ABDOMINAL PAIN: Primary | ICD-10-CM

## 2024-10-07 ENCOUNTER — PATIENT MESSAGE (OUTPATIENT)
Dept: FAMILY MEDICINE CLINIC | Age: 74
End: 2024-10-07

## 2024-10-07 DIAGNOSIS — S39.011S STRAIN OF ABDOMINAL WALL, SEQUELA: Primary | ICD-10-CM

## 2025-05-22 ENCOUNTER — TELEPHONE (OUTPATIENT)
Dept: FAMILY MEDICINE CLINIC | Age: 75
End: 2025-05-22

## 2025-05-22 DIAGNOSIS — R10.11 RIGHT UPPER QUADRANT PAIN: ICD-10-CM

## 2025-05-22 DIAGNOSIS — R10.30 LOWER ABDOMINAL PAIN: Primary | ICD-10-CM

## 2025-05-22 NOTE — TELEPHONE ENCOUNTER
Pt called to get an appt for extreme tired for a week. She is also experiencing rib and ab pain. She states this has been going on for a long time.  Please give pt a call [phone]. [facility] [phone]

## 2025-05-23 NOTE — TELEPHONE ENCOUNTER
Spoke with patient she is coming 6/9 for her AWV. Scheduled her 5/29 appt for abdominal and rib pain. Patient coming 5/28 for blood work. Orders in epic.

## 2025-05-26 SDOH — ECONOMIC STABILITY: FOOD INSECURITY: WITHIN THE PAST 12 MONTHS, YOU WORRIED THAT YOUR FOOD WOULD RUN OUT BEFORE YOU GOT MONEY TO BUY MORE.: NEVER TRUE

## 2025-05-26 SDOH — ECONOMIC STABILITY: INCOME INSECURITY: IN THE LAST 12 MONTHS, WAS THERE A TIME WHEN YOU WERE NOT ABLE TO PAY THE MORTGAGE OR RENT ON TIME?: NO

## 2025-05-26 SDOH — ECONOMIC STABILITY: FOOD INSECURITY: WITHIN THE PAST 12 MONTHS, THE FOOD YOU BOUGHT JUST DIDN'T LAST AND YOU DIDN'T HAVE MONEY TO GET MORE.: NEVER TRUE

## 2025-05-26 SDOH — ECONOMIC STABILITY: TRANSPORTATION INSECURITY
IN THE PAST 12 MONTHS, HAS THE LACK OF TRANSPORTATION KEPT YOU FROM MEDICAL APPOINTMENTS OR FROM GETTING MEDICATIONS?: NO

## 2025-05-26 ASSESSMENT — PATIENT HEALTH QUESTIONNAIRE - PHQ9
2. FEELING DOWN, DEPRESSED OR HOPELESS: NOT AT ALL
SUM OF ALL RESPONSES TO PHQ QUESTIONS 1-9: 0
1. LITTLE INTEREST OR PLEASURE IN DOING THINGS: NOT AT ALL
SUM OF ALL RESPONSES TO PHQ QUESTIONS 1-9: 0
SUM OF ALL RESPONSES TO PHQ QUESTIONS 1-9: 0
2. FEELING DOWN, DEPRESSED OR HOPELESS: NOT AT ALL
SUM OF ALL RESPONSES TO PHQ9 QUESTIONS 1 & 2: 0
SUM OF ALL RESPONSES TO PHQ QUESTIONS 1-9: 0
1. LITTLE INTEREST OR PLEASURE IN DOING THINGS: NOT AT ALL

## 2025-05-28 ENCOUNTER — LAB (OUTPATIENT)
Dept: FAMILY MEDICINE CLINIC | Age: 75
End: 2025-05-28
Payer: MEDICARE

## 2025-05-28 DIAGNOSIS — R10.11 RIGHT UPPER QUADRANT PAIN: ICD-10-CM

## 2025-05-28 DIAGNOSIS — R10.30 LOWER ABDOMINAL PAIN: ICD-10-CM

## 2025-05-28 DIAGNOSIS — R53.83 OTHER FATIGUE: Primary | ICD-10-CM

## 2025-05-28 LAB
25(OH)D3 SERPL-MCNC: 54.5 NG/ML
ALBUMIN SERPL-MCNC: 4.2 G/DL (ref 3.4–5)
ALBUMIN/GLOB SERPL: 2.2 {RATIO} (ref 1.1–2.2)
ALP SERPL-CCNC: 91 U/L (ref 40–129)
ALT SERPL-CCNC: 22 U/L (ref 10–40)
ANION GAP SERPL CALCULATED.3IONS-SCNC: 11 MMOL/L (ref 3–16)
AST SERPL-CCNC: 21 U/L (ref 15–37)
BASOPHILS # BLD: 0 K/UL (ref 0–0.2)
BASOPHILS NFR BLD: 0.9 %
BILIRUB SERPL-MCNC: 0.4 MG/DL (ref 0–1)
BUN SERPL-MCNC: 13 MG/DL (ref 7–20)
CALCIUM SERPL-MCNC: 9.4 MG/DL (ref 8.3–10.6)
CHLORIDE SERPL-SCNC: 104 MMOL/L (ref 99–110)
CHOLEST SERPL-MCNC: 235 MG/DL (ref 0–199)
CO2 SERPL-SCNC: 26 MMOL/L (ref 21–32)
CREAT SERPL-MCNC: 0.8 MG/DL (ref 0.6–1.2)
DEPRECATED RDW RBC AUTO: 12.7 % (ref 12.4–15.4)
EOSINOPHIL # BLD: 0.1 K/UL (ref 0–0.6)
EOSINOPHIL NFR BLD: 3 %
EST. AVERAGE GLUCOSE BLD GHB EST-MCNC: 99.7 MG/DL
GFR SERPLBLD CREATININE-BSD FMLA CKD-EPI: 76 ML/MIN/{1.73_M2}
GLUCOSE SERPL-MCNC: 109 MG/DL (ref 70–99)
HBA1C MFR BLD: 5.1 %
HCT VFR BLD AUTO: 45 % (ref 36–48)
HDLC SERPL-MCNC: 60 MG/DL (ref 40–60)
HGB BLD-MCNC: 15.4 G/DL (ref 12–16)
IRON SATN MFR SERPL: 34 % (ref 15–50)
IRON SERPL-MCNC: 106 UG/DL (ref 37–145)
LDLC SERPL CALC-MCNC: 149 MG/DL
LYMPHOCYTES # BLD: 1.8 K/UL (ref 1–5.1)
LYMPHOCYTES NFR BLD: 36.3 %
MCH RBC QN AUTO: 31.1 PG (ref 26–34)
MCHC RBC AUTO-ENTMCNC: 34.2 G/DL (ref 31–36)
MCV RBC AUTO: 90.8 FL (ref 80–100)
MONOCYTES # BLD: 0.4 K/UL (ref 0–1.3)
MONOCYTES NFR BLD: 7.8 %
NEUTROPHILS # BLD: 2.6 K/UL (ref 1.7–7.7)
NEUTROPHILS NFR BLD: 52 %
PLATELET # BLD AUTO: 210 K/UL (ref 135–450)
PMV BLD AUTO: 10.6 FL (ref 5–10.5)
POTASSIUM SERPL-SCNC: 4.6 MMOL/L (ref 3.5–5.1)
PROT SERPL-MCNC: 6.1 G/DL (ref 6.4–8.2)
RBC # BLD AUTO: 4.95 M/UL (ref 4–5.2)
SODIUM SERPL-SCNC: 141 MMOL/L (ref 136–145)
TIBC SERPL-MCNC: 312 UG/DL (ref 260–445)
TRIGL SERPL-MCNC: 130 MG/DL (ref 0–150)
TSH SERPL DL<=0.005 MIU/L-ACNC: 1 UIU/ML (ref 0.27–4.2)
VIT B12 SERPL-MCNC: 891 PG/ML (ref 211–911)
VLDLC SERPL CALC-MCNC: 26 MG/DL
WBC # BLD AUTO: 4.9 K/UL (ref 4–11)

## 2025-05-28 PROCEDURE — 36415 COLL VENOUS BLD VENIPUNCTURE: CPT | Performed by: FAMILY MEDICINE

## 2025-05-29 ENCOUNTER — OFFICE VISIT (OUTPATIENT)
Dept: FAMILY MEDICINE CLINIC | Age: 75
End: 2025-05-29

## 2025-05-29 VITALS
BODY MASS INDEX: 36.12 KG/M2 | DIASTOLIC BLOOD PRESSURE: 70 MMHG | HEIGHT: 65 IN | SYSTOLIC BLOOD PRESSURE: 124 MMHG | WEIGHT: 216.8 LBS

## 2025-05-29 DIAGNOSIS — S29.9XXA TRAUMATIC INJURY OF RIB: ICD-10-CM

## 2025-05-29 DIAGNOSIS — R53.83 FATIGUE, UNSPECIFIED TYPE: ICD-10-CM

## 2025-05-29 DIAGNOSIS — Z00.00 ENCOUNTER FOR ANNUAL WELLNESS VISIT (AWV) IN MEDICARE PATIENT: Primary | ICD-10-CM

## 2025-05-29 DIAGNOSIS — M51.360 DEGENERATION OF INTERVERTEBRAL DISC OF LUMBAR REGION WITH DISCOGENIC BACK PAIN: ICD-10-CM

## 2025-05-29 DIAGNOSIS — M51.34 DEGENERATIVE DISC DISEASE, THORACIC: ICD-10-CM

## 2025-05-29 RX ORDER — DICLOFENAC SODIUM 75 MG/1
75 TABLET, DELAYED RELEASE ORAL 2 TIMES DAILY
Qty: 60 TABLET | Refills: 0 | Status: SHIPPED | OUTPATIENT
Start: 2025-05-29

## 2025-05-29 ASSESSMENT — LIFESTYLE VARIABLES
HOW MANY STANDARD DRINKS CONTAINING ALCOHOL DO YOU HAVE ON A TYPICAL DAY: 1 OR 2
HOW OFTEN DO YOU HAVE A DRINK CONTAINING ALCOHOL: 2-4 TIMES A MONTH

## 2025-05-29 NOTE — TELEPHONE ENCOUNTER
Pt needs a refill for her Diclofenac 75 mg sent to [facility] [phone]

## 2025-05-29 NOTE — TELEPHONE ENCOUNTER
Last office visit 5/29/2025     Next office visit scheduled 6/9/2025    Requested Prescriptions     Pending Prescriptions Disp Refills    diclofenac (VOLTAREN) 75 MG EC tablet 60 tablet 1     Sig: Take 1 tablet by mouth 2 times daily

## 2025-05-30 NOTE — PROGRESS NOTES
Medicare Annual Wellness Visit  Name: Yvette Moreno Today’s Date: 2025   MRN: [id number] Sex: Female   Age: 75 y.o. Ethnicity: Non- / Non    : 1950 Race: White (non-)      Yvette Moreno is here for Medicare AWV  Assessment/plan;  Yvette was seen today for medicare awv.    Diagnoses and all orders for this visit:    Encounter for annual wellness visit (AWV) in Medicare patient    Degeneration of intervertebral disc of lumbar region with discogenic back pain  -     CT LUMBAR SPINE WO CONTRAST; Future  Saw specialist in the past and they tried to do epidural injection but were unable to arthritis in spine  Degenerative disc disease, thoracic  -     CT THORACIC SPINE WO CONTRAST; Future  Pain right lower ribs to touch    Fatigue, unspecified type  Had all of her blood work and all looks good  Under a lot of stress   caring for her mom and her brother had CABG and now infection of incision and he may have to go back to surgery  she is helping out with his care   has not been able to play softball     No follow-ups on file.    Screenings for behavioral, psychosocial and functional/safety risks, and cognitive dysfunction are all negative except as indicated below. These results, as well as other patient data from the Health Risk Assessment form, are documented in Flowsheets linked to this Encounter.    Allergies   Allergen Reactions    Bee Venom Swelling    Egg-Derived Products Other (See Comments)    Nuts [Peanut-Containing Drug Products] Other (See Comments)    Poison Ivy Extract     Morphine Nausea And Vomiting    Penicillins Rash       Prior to Visit Medications    Medication Sig Taking? Authorizing Provider   Cholecalciferol (VITAMIN D) 10 MCG (400 UNIT) CAPS Capsule Take by mouth Yes Elian Samaniego MD   Alpha-Lipoic Acid 100 MG CAPS Take by mouth Yes Elian Samaniego MD   Berberine Chloride 500 MG CAPS Take 500 mg by mouth in the morning and at bedtime Yes Danette

## 2025-06-03 ENCOUNTER — TELEPHONE (OUTPATIENT)
Dept: FAMILY MEDICINE CLINIC | Age: 75
End: 2025-06-03

## 2025-06-10 ENCOUNTER — TELEPHONE (OUTPATIENT)
Dept: FAMILY MEDICINE CLINIC | Age: 75
End: 2025-06-10

## 2025-06-10 NOTE — TELEPHONE ENCOUNTER
Patient called insurance Company and talked to Physician Support unit. ( phone [phone] option 1 )  She needs Dr Barriga to send a  detail exam showing that the patient try medication or chiropractor care. Patient was going to Chiropractor on  April 2 days week with Dr Rajiv Wallace , she  stop last week. -  his number  is [phone] - [facility]

## 2025-06-10 NOTE — TELEPHONE ENCOUNTER
Spoke with patient and informed her she would need to call chiropractor office and have them send us the clinic notes to send to insurance company. She verbalized understanding.

## 2025-06-10 NOTE — TELEPHONE ENCOUNTER
Pt called to to see what her next steps would be to get the CT lumbar and thoracic spine. She received a call stating the CT's were denied . Please give a call [phone].

## 2025-06-17 ENCOUNTER — TELEPHONE (OUTPATIENT)
Dept: FAMILY MEDICINE CLINIC | Age: 75
End: 2025-06-17

## 2025-06-18 NOTE — TELEPHONE ENCOUNTER
Still working on this. Will update patient when I have more information. Please see other encounter for more information.

## 2025-06-26 ENCOUNTER — TELEPHONE (OUTPATIENT)
Dept: FAMILY MEDICINE CLINIC | Age: 75
End: 2025-06-26

## 2025-06-26 NOTE — TELEPHONE ENCOUNTER
Pt would like a return call about the status of the approval for the CT scans. Please give pt a call [phone]

## 2025-06-27 NOTE — TELEPHONE ENCOUNTER
Spoke to pt, informed her that information has been faxed to expedited fax number.  Will check Monday morning for response.

## 2025-07-07 NOTE — TELEPHONE ENCOUNTER
Patient called requesting the status of her CT scan. Advised as of 7/1 we were still waiting for a response from her insurance company. Patient stated there was suppose to be a response with 72 hours and asked in anyone in office followed up. Advised I was only relaying what was in her chart, patient then stated I needed to connect her with someone else. Advised I would send a message to the staff. Patient then stated \"make sure you tell them I have an attitude\" Patient also stated this has been going on for 3 weeks, that if the CT is not going to be done she wants to know what else Dr Barriga is going to do to get her out of pain, that she is tired of being in pain. Assured patient I would send a message, patient then stated sarcastically \" I can tell you care\" then hung up. Please contact the patient

## 2025-07-09 NOTE — TELEPHONE ENCOUNTER
Spoke to insurance company regarding approvals.  Madalyn informed me that both Cts were denied.  Chiropractor notes need to be within 6 weeks.  Madalyn also stated that pt will need 6 weeks of some kind of treatment to get this approved.  She said it could be medication, home exercises, PT, something like that.  Pt will need to come into office after 6 weeks of whatever is decided on, Dr will have to document what was done and that it failed.  A new order can be placed then.  She stated new order must be on or after 8/9/25.    Would you like pt to start 6 weeks of home exercises, pt, medication. So we can get these approved.

## 2025-07-09 NOTE — TELEPHONE ENCOUNTER
Please notify pt of this   she can try heat, stretching and anti inflammatory for next six weeks so we can try again to get this covered

## 2025-07-10 ENCOUNTER — PATIENT MESSAGE (OUTPATIENT)
Dept: FAMILY MEDICINE CLINIC | Age: 75
End: 2025-07-10

## 2025-08-12 ENCOUNTER — E-VISIT (OUTPATIENT)
Dept: FAMILY MEDICINE CLINIC | Age: 75
End: 2025-08-12
Payer: MEDICARE

## 2025-08-12 DIAGNOSIS — J01.90 ACUTE BACTERIAL SINUSITIS: Primary | ICD-10-CM

## 2025-08-12 DIAGNOSIS — B96.89 ACUTE BACTERIAL SINUSITIS: Primary | ICD-10-CM

## 2025-08-12 PROCEDURE — 99422 OL DIG E/M SVC 11-20 MIN: CPT | Performed by: FAMILY MEDICINE

## 2025-08-12 RX ORDER — DOXYCYCLINE HYCLATE 100 MG
100 TABLET ORAL 2 TIMES DAILY
Qty: 20 TABLET | Refills: 0 | Status: SHIPPED | OUTPATIENT
Start: 2025-08-12 | End: 2025-08-22

## 2025-08-12 ASSESSMENT — LIFESTYLE VARIABLES: SMOKING_STATUS: NO, I'VE NEVER SMOKED

## (undated) DEVICE — SPONGE GZ W4XL4IN COT 12 PLY TYP VII WVN C FLD DSGN STERILE

## (undated) DEVICE — STRIP,CLOSURE,WOUND,MEDI-STRIP,1/2X4: Brand: MEDLINE

## (undated) DEVICE — CLEANER,CAUTERY TIP,2X2",STERILE: Brand: MEDLINE

## (undated) DEVICE — CANISTER, RIGID, 1200CC: Brand: MEDLINE INDUSTRIES, INC.

## (undated) DEVICE — GAUZE,SPONGE,2"X2",8PLY,STERILE,LF,2'S: Brand: MEDLINE

## (undated) DEVICE — MINOR SET UP: Brand: MEDLINE INDUSTRIES, INC.

## (undated) DEVICE — SHEET,DRAPE,53X77,STERILE: Brand: MEDLINE

## (undated) DEVICE — GLOVE ORANGE PI 7   MSG9070

## (undated) DEVICE — DRAPE,MINOR PROC,6X6 FEN, STER: Brand: MEDLINE